# Patient Record
Sex: MALE | Race: BLACK OR AFRICAN AMERICAN | URBAN - METROPOLITAN AREA
[De-identification: names, ages, dates, MRNs, and addresses within clinical notes are randomized per-mention and may not be internally consistent; named-entity substitution may affect disease eponyms.]

---

## 2019-02-07 ENCOUNTER — HISTORICAL (OUTPATIENT)
Dept: ADMINISTRATIVE | Facility: HOSPITAL | Age: 79
End: 2019-02-07

## 2019-02-07 LAB
ABS NEUT (OLG): 2.47 X10(3)/MCL (ref 2.1–9.2)
ALBUMIN SERPL-MCNC: 3.5 GM/DL (ref 3.4–5)
ALBUMIN/GLOB SERPL: 0.9 RATIO (ref 1.1–2)
ALP SERPL-CCNC: 117 UNIT/L (ref 50–136)
ALT SERPL-CCNC: 32 UNIT/L (ref 12–78)
AST SERPL-CCNC: 18 UNIT/L (ref 15–37)
BASOPHILS # BLD AUTO: 0 X10(3)/MCL (ref 0–0.2)
BASOPHILS NFR BLD AUTO: 0.4 %
BILIRUB SERPL-MCNC: 0.4 MG/DL (ref 0.2–1)
BILIRUBIN DIRECT+TOT PNL SERPL-MCNC: 0.1 MG/DL (ref 0–0.5)
BILIRUBIN DIRECT+TOT PNL SERPL-MCNC: 0.3 MG/DL (ref 0–0.8)
BUN SERPL-MCNC: 14 MG/DL (ref 7–18)
CALCIUM SERPL-MCNC: 9.3 MG/DL (ref 8.5–10.1)
CHLORIDE SERPL-SCNC: 105 MMOL/L (ref 98–107)
CO2 SERPL-SCNC: 25 MMOL/L (ref 21–32)
CREAT SERPL-MCNC: 1.12 MG/DL (ref 0.7–1.3)
EOSINOPHIL # BLD AUTO: 0.1 X10(3)/MCL (ref 0–0.9)
EOSINOPHIL NFR BLD AUTO: 2.3 %
ERYTHROCYTE [DISTWIDTH] IN BLOOD BY AUTOMATED COUNT: 14.3 % (ref 11.5–17)
GLOBULIN SER-MCNC: 4 GM/DL (ref 2.4–3.5)
GLUCOSE SERPL-MCNC: 189 MG/DL (ref 74–106)
HCT VFR BLD AUTO: 43.3 % (ref 42–52)
HGB BLD-MCNC: 13.9 GM/DL (ref 14–18)
LYMPHOCYTES # BLD AUTO: 1.6 X10(3)/MCL (ref 0.6–4.6)
LYMPHOCYTES NFR BLD AUTO: 34 %
MCH RBC QN AUTO: 26.6 PG (ref 27–31)
MCHC RBC AUTO-ENTMCNC: 32.1 GM/DL (ref 33–36)
MCV RBC AUTO: 83 FL (ref 80–94)
MONOCYTES # BLD AUTO: 0.5 X10(3)/MCL (ref 0.1–1.3)
MONOCYTES NFR BLD AUTO: 10.6 %
NEUTROPHILS # BLD AUTO: 2.5 X10(3)/MCL (ref 2.1–9.2)
NEUTROPHILS NFR BLD AUTO: 52.5 %
PLATELET # BLD AUTO: 315 X10(3)/MCL (ref 130–400)
PMV BLD AUTO: 8.6 FL (ref 9.4–12.4)
POTASSIUM SERPL-SCNC: 3.8 MMOL/L (ref 3.5–5.1)
PROT SERPL-MCNC: 7.5 GM/DL (ref 6.4–8.2)
RBC # BLD AUTO: 5.22 X10(6)/MCL (ref 4.7–6.1)
SODIUM SERPL-SCNC: 138 MMOL/L (ref 136–145)
WBC # SPEC AUTO: 4.7 X10(3)/MCL (ref 4.5–11.5)

## 2019-02-11 ENCOUNTER — HISTORICAL (OUTPATIENT)
Dept: RADIATION THERAPY | Facility: HOSPITAL | Age: 79
End: 2019-02-11

## 2019-02-18 ENCOUNTER — HISTORICAL (OUTPATIENT)
Dept: ANESTHESIOLOGY | Facility: HOSPITAL | Age: 79
End: 2019-02-18

## 2019-03-21 ENCOUNTER — HISTORICAL (OUTPATIENT)
Dept: ADMINISTRATIVE | Facility: HOSPITAL | Age: 79
End: 2019-03-21

## 2019-03-21 LAB
ABS NEUT (OLG): 2.25 X10(3)/MCL (ref 2.1–9.2)
ALBUMIN SERPL-MCNC: 3.5 GM/DL (ref 3.4–5)
ALBUMIN/GLOB SERPL: 0.8 {RATIO}
ALP SERPL-CCNC: 130 UNIT/L (ref 50–136)
ALT SERPL-CCNC: 25 UNIT/L (ref 12–78)
AST SERPL-CCNC: 23 UNIT/L (ref 15–37)
BASOPHILS # BLD AUTO: 0 X10(3)/MCL (ref 0–0.2)
BASOPHILS NFR BLD AUTO: 0.4 %
BILIRUB SERPL-MCNC: 0.4 MG/DL (ref 0.2–1)
BILIRUBIN DIRECT+TOT PNL SERPL-MCNC: 0.1 MG/DL (ref 0–0.2)
BILIRUBIN DIRECT+TOT PNL SERPL-MCNC: 0.3 MG/DL (ref 0–0.8)
BUN SERPL-MCNC: 12 MG/DL (ref 7–18)
CALCIUM SERPL-MCNC: 9.6 MG/DL (ref 8.5–10.1)
CHLORIDE SERPL-SCNC: 101 MMOL/L (ref 98–107)
CO2 SERPL-SCNC: 28 MMOL/L (ref 21–32)
CREAT SERPL-MCNC: 1.06 MG/DL (ref 0.7–1.3)
EOSINOPHIL # BLD AUTO: 0 X10(3)/MCL (ref 0–0.9)
EOSINOPHIL NFR BLD AUTO: 0.8 %
ERYTHROCYTE [DISTWIDTH] IN BLOOD BY AUTOMATED COUNT: 13 % (ref 11.5–17)
GLOBULIN SER-MCNC: 4.2 GM/DL (ref 2.4–3.5)
GLUCOSE SERPL-MCNC: 172 MG/DL (ref 74–106)
HCT VFR BLD AUTO: 44.4 % (ref 42–52)
HGB BLD-MCNC: 14.4 GM/DL (ref 14–18)
LYMPHOCYTES # BLD AUTO: 2.3 X10(3)/MCL (ref 0.6–4.6)
LYMPHOCYTES NFR BLD AUTO: 46.8 %
MCH RBC QN AUTO: 27.3 PG (ref 27–31)
MCHC RBC AUTO-ENTMCNC: 32.4 GM/DL (ref 33–36)
MCV RBC AUTO: 84.3 FL (ref 80–94)
MONOCYTES # BLD AUTO: 0.3 X10(3)/MCL (ref 0.1–1.3)
MONOCYTES NFR BLD AUTO: 6.8 %
NEUTROPHILS # BLD AUTO: 2.2 X10(3)/MCL (ref 2.1–9.2)
NEUTROPHILS NFR BLD AUTO: 45.2 %
PLATELET # BLD AUTO: 334 X10(3)/MCL (ref 130–400)
PMV BLD AUTO: 8.5 FL (ref 9.4–12.4)
POTASSIUM SERPL-SCNC: 3.7 MMOL/L (ref 3.5–5.1)
PROT SERPL-MCNC: 7.7 GM/DL (ref 6.4–8.2)
RBC # BLD AUTO: 5.27 X10(6)/MCL (ref 4.7–6.1)
SODIUM SERPL-SCNC: 138 MMOL/L (ref 136–145)
WBC # SPEC AUTO: 5 X10(3)/MCL (ref 4.5–11.5)

## 2019-04-09 ENCOUNTER — HISTORICAL (OUTPATIENT)
Dept: ADMINISTRATIVE | Facility: HOSPITAL | Age: 79
End: 2019-04-09

## 2019-04-09 LAB
ABS NEUT (OLG): 3.36 X10(3)/MCL (ref 2.1–9.2)
ALBUMIN SERPL-MCNC: 3.4 GM/DL (ref 3.4–5)
ALBUMIN/GLOB SERPL: 0.9 RATIO (ref 1.1–2)
ALP SERPL-CCNC: 141 UNIT/L (ref 50–136)
ALT SERPL-CCNC: 31 UNIT/L (ref 12–78)
AST SERPL-CCNC: 26 UNIT/L (ref 15–37)
BASOPHILS # BLD AUTO: 0 X10(3)/MCL (ref 0–0.2)
BASOPHILS NFR BLD AUTO: 0.5 %
BILIRUB SERPL-MCNC: 0.4 MG/DL (ref 0.2–1)
BILIRUBIN DIRECT+TOT PNL SERPL-MCNC: 0.1 MG/DL (ref 0–0.5)
BILIRUBIN DIRECT+TOT PNL SERPL-MCNC: 0.3 MG/DL (ref 0–0.8)
BUN SERPL-MCNC: 14 MG/DL (ref 7–18)
CALCIUM SERPL-MCNC: 9.6 MG/DL (ref 8.5–10.1)
CHLORIDE SERPL-SCNC: 101 MMOL/L (ref 98–107)
CO2 SERPL-SCNC: 26 MMOL/L (ref 21–32)
CREAT SERPL-MCNC: 1.16 MG/DL (ref 0.7–1.3)
EOSINOPHIL # BLD AUTO: 0 X10(3)/MCL (ref 0–0.9)
EOSINOPHIL NFR BLD AUTO: 0.8 %
ERYTHROCYTE [DISTWIDTH] IN BLOOD BY AUTOMATED COUNT: 12.8 % (ref 11.5–17)
GLOBULIN SER-MCNC: 3.8 GM/DL (ref 2.4–3.5)
GLUCOSE SERPL-MCNC: 256 MG/DL (ref 74–106)
HCT VFR BLD AUTO: 43.3 % (ref 42–52)
HGB BLD-MCNC: 14.1 GM/DL (ref 14–18)
LYMPHOCYTES # BLD AUTO: 2.4 X10(3)/MCL (ref 0.6–4.6)
LYMPHOCYTES NFR BLD AUTO: 38.3 %
MCH RBC QN AUTO: 27.6 PG (ref 27–31)
MCHC RBC AUTO-ENTMCNC: 32.6 GM/DL (ref 33–36)
MCV RBC AUTO: 84.7 FL (ref 80–94)
MONOCYTES # BLD AUTO: 0.4 X10(3)/MCL (ref 0.1–1.3)
MONOCYTES NFR BLD AUTO: 6.4 %
NEUTROPHILS # BLD AUTO: 3.4 X10(3)/MCL (ref 2.1–9.2)
NEUTROPHILS NFR BLD AUTO: 53.8 %
PLATELET # BLD AUTO: 313 X10(3)/MCL (ref 130–400)
PMV BLD AUTO: 8.6 FL (ref 9.4–12.4)
POTASSIUM SERPL-SCNC: 3.4 MMOL/L (ref 3.5–5.1)
PROT SERPL-MCNC: 7.2 GM/DL (ref 6.4–8.2)
RBC # BLD AUTO: 5.11 X10(6)/MCL (ref 4.7–6.1)
SODIUM SERPL-SCNC: 137 MMOL/L (ref 136–145)
WBC # SPEC AUTO: 6.2 X10(3)/MCL (ref 4.5–11.5)

## 2019-04-10 ENCOUNTER — HISTORICAL (OUTPATIENT)
Dept: INFUSION THERAPY | Facility: HOSPITAL | Age: 79
End: 2019-04-10

## 2019-04-17 ENCOUNTER — HISTORICAL (OUTPATIENT)
Dept: INFUSION THERAPY | Facility: HOSPITAL | Age: 79
End: 2019-04-17

## 2019-04-17 LAB
ABS NEUT (OLG): 1.72 X10(3)/MCL (ref 2.1–9.2)
ALBUMIN SERPL-MCNC: 3.4 GM/DL (ref 3.4–5)
ALBUMIN/GLOB SERPL: 0.8 RATIO (ref 1.1–2)
ALP SERPL-CCNC: 120 UNIT/L (ref 50–136)
ALT SERPL-CCNC: 60 UNIT/L (ref 12–78)
AST SERPL-CCNC: 29 UNIT/L (ref 15–37)
BASOPHILS # BLD AUTO: 0 X10(3)/MCL (ref 0–0.2)
BASOPHILS NFR BLD AUTO: 0.8 %
BILIRUB SERPL-MCNC: 0.6 MG/DL (ref 0.2–1)
BILIRUBIN DIRECT+TOT PNL SERPL-MCNC: 0.1 MG/DL (ref 0–0.5)
BILIRUBIN DIRECT+TOT PNL SERPL-MCNC: 0.5 MG/DL (ref 0–0.8)
BUN SERPL-MCNC: 14 MG/DL (ref 7–18)
CALCIUM SERPL-MCNC: 9.6 MG/DL (ref 8.5–10.1)
CHLORIDE SERPL-SCNC: 101 MMOL/L (ref 98–107)
CO2 SERPL-SCNC: 28 MMOL/L (ref 21–32)
CREAT SERPL-MCNC: 1.03 MG/DL (ref 0.7–1.3)
EOSINOPHIL # BLD AUTO: 0.1 X10(3)/MCL (ref 0–0.9)
EOSINOPHIL NFR BLD AUTO: 1.9 %
ERYTHROCYTE [DISTWIDTH] IN BLOOD BY AUTOMATED COUNT: 12.6 % (ref 11.5–17)
GLOBULIN SER-MCNC: 4 GM/DL (ref 2.4–3.5)
GLUCOSE SERPL-MCNC: 211 MG/DL (ref 74–106)
HCT VFR BLD AUTO: 39.6 % (ref 42–52)
HGB BLD-MCNC: 12.9 GM/DL (ref 14–18)
LYMPHOCYTES # BLD AUTO: 1.7 X10(3)/MCL (ref 0.6–4.6)
LYMPHOCYTES NFR BLD AUTO: 46.3 %
MCH RBC QN AUTO: 27.4 PG (ref 27–31)
MCHC RBC AUTO-ENTMCNC: 32.6 GM/DL (ref 33–36)
MCV RBC AUTO: 84.1 FL (ref 80–94)
MONOCYTES # BLD AUTO: 0.2 X10(3)/MCL (ref 0.1–1.3)
MONOCYTES NFR BLD AUTO: 5.1 %
NEUTROPHILS # BLD AUTO: 1.7 X10(3)/MCL (ref 2.1–9.2)
NEUTROPHILS NFR BLD AUTO: 45.9 %
PLATELET # BLD AUTO: 225 X10(3)/MCL (ref 130–400)
PMV BLD AUTO: 8.7 FL (ref 9.4–12.4)
POTASSIUM SERPL-SCNC: 3.6 MMOL/L (ref 3.5–5.1)
PROT SERPL-MCNC: 7.4 GM/DL (ref 6.4–8.2)
RBC # BLD AUTO: 4.71 X10(6)/MCL (ref 4.7–6.1)
SODIUM SERPL-SCNC: 136 MMOL/L (ref 136–145)
WBC # SPEC AUTO: 3.7 X10(3)/MCL (ref 4.5–11.5)

## 2019-04-18 ENCOUNTER — HISTORICAL (OUTPATIENT)
Dept: ADMINISTRATIVE | Facility: HOSPITAL | Age: 79
End: 2019-04-18

## 2019-04-18 LAB
ABS NEUT (OLG): 4.23 X10(3)/MCL (ref 2.1–9.2)
ANION GAP SERPL CALC-SCNC: 17 MMOL/L
BASOPHILS # BLD AUTO: 0 X10(3)/MCL (ref 0–0.2)
BASOPHILS NFR BLD AUTO: 0.2 %
BUN SERPL-MCNC: 20 MG/DL (ref 8–26)
CHLORIDE SERPL-SCNC: 100 MMOL/L (ref 98–109)
CREAT SERPL-MCNC: 0.9 MG/DL (ref 0.6–1.3)
ERYTHROCYTE [DISTWIDTH] IN BLOOD BY AUTOMATED COUNT: 12.4 % (ref 11.5–17)
GLUCOSE SERPL-MCNC: 212 MG/DL (ref 70–105)
HCT VFR BLD AUTO: 37.5 % (ref 42–52)
HCT VFR BLD CALC: 37 % (ref 38–51)
HGB BLD-MCNC: 12.4 GM/DL (ref 14–18)
HGB BLD-MCNC: 12.6 MG/DL (ref 12–17)
LYMPHOCYTES # BLD AUTO: 1.8 X10(3)/MCL (ref 0.6–4.6)
LYMPHOCYTES NFR BLD AUTO: 27.8 %
MCH RBC QN AUTO: 27.6 PG (ref 27–31)
MCHC RBC AUTO-ENTMCNC: 33.1 GM/DL (ref 33–36)
MCV RBC AUTO: 83.5 FL (ref 80–94)
MONOCYTES # BLD AUTO: 0.4 X10(3)/MCL (ref 0.1–1.3)
MONOCYTES NFR BLD AUTO: 5.5 %
NEUTROPHILS # BLD AUTO: 4.2 X10(3)/MCL (ref 2.1–9.2)
NEUTROPHILS NFR BLD AUTO: 66.3 %
PLATELET # BLD AUTO: 200 X10(3)/MCL (ref 130–400)
PMV BLD AUTO: 8.8 FL (ref 9.4–12.4)
POC IONIZED CALCIUM: 1.2 MMOL/L (ref 1.12–1.32)
POC TCO2: 24 MMOL/L (ref 24–29)
POTASSIUM BLD-SCNC: 3.7 MMOL/L (ref 3.5–4.9)
RBC # BLD AUTO: 4.49 X10(6)/MCL (ref 4.7–6.1)
SODIUM BLD-SCNC: 136 MMOL/L (ref 138–146)
WBC # SPEC AUTO: 6.4 X10(3)/MCL (ref 4.5–11.5)

## 2019-04-30 ENCOUNTER — HISTORICAL (OUTPATIENT)
Dept: ADMINISTRATIVE | Facility: HOSPITAL | Age: 79
End: 2019-04-30

## 2019-04-30 LAB
ABS NEUT (OLG): 2.64 X10(3)/MCL (ref 2.1–9.2)
ANION GAP SERPL CALC-SCNC: 18 MMOL/L
BASOPHILS # BLD AUTO: 0 X10(3)/MCL (ref 0–0.2)
BASOPHILS NFR BLD AUTO: 0.2 %
BUN SERPL-MCNC: 8 MG/DL (ref 8–26)
CHLORIDE SERPL-SCNC: 98 MMOL/L (ref 98–109)
CREAT SERPL-MCNC: 1 MG/DL (ref 0.6–1.3)
EOSINOPHIL # BLD AUTO: 0 X10(3)/MCL (ref 0–0.9)
EOSINOPHIL NFR BLD AUTO: 0.4 %
ERYTHROCYTE [DISTWIDTH] IN BLOOD BY AUTOMATED COUNT: 13.8 % (ref 11.5–17)
GLUCOSE SERPL-MCNC: 158 MG/DL (ref 70–105)
HCT VFR BLD AUTO: 38.4 % (ref 42–52)
HCT VFR BLD CALC: 38 % (ref 38–51)
HGB BLD-MCNC: 12.5 GM/DL (ref 14–18)
HGB BLD-MCNC: 12.9 MG/DL (ref 12–17)
LYMPHOCYTES # BLD AUTO: 1.7 X10(3)/MCL (ref 0.6–4.6)
LYMPHOCYTES NFR BLD AUTO: 35.8 %
MCH RBC QN AUTO: 27.6 PG (ref 27–31)
MCHC RBC AUTO-ENTMCNC: 32.6 GM/DL (ref 33–36)
MCV RBC AUTO: 84.8 FL (ref 80–94)
MONOCYTES # BLD AUTO: 0.4 X10(3)/MCL (ref 0.1–1.3)
MONOCYTES NFR BLD AUTO: 7.4 %
NEUTROPHILS # BLD AUTO: 2.6 X10(3)/MCL (ref 2.1–9.2)
NEUTROPHILS NFR BLD AUTO: 56 %
PLATELET # BLD AUTO: 295 X10(3)/MCL (ref 130–400)
PMV BLD AUTO: 8 FL (ref 9.4–12.4)
POC IONIZED CALCIUM: 1.2 MMOL/L (ref 1.12–1.32)
POC TCO2: 28 MMOL/L (ref 24–29)
POTASSIUM BLD-SCNC: 3.5 MMOL/L (ref 3.5–4.9)
RBC # BLD AUTO: 4.53 X10(6)/MCL (ref 4.7–6.1)
SODIUM BLD-SCNC: 139 MMOL/L (ref 138–146)
WBC # SPEC AUTO: 4.7 X10(3)/MCL (ref 4.5–11.5)

## 2019-05-01 ENCOUNTER — HISTORICAL (OUTPATIENT)
Dept: INFUSION THERAPY | Facility: HOSPITAL | Age: 79
End: 2019-05-01

## 2019-05-21 ENCOUNTER — HISTORICAL (OUTPATIENT)
Dept: ADMINISTRATIVE | Facility: HOSPITAL | Age: 79
End: 2019-05-21

## 2019-05-21 LAB
ABS NEUT (OLG): 1.36 X10(3)/MCL (ref 2.1–9.2)
ALBUMIN SERPL-MCNC: 3.4 GM/DL (ref 3.4–5)
ALBUMIN/GLOB SERPL: 1 {RATIO}
ALP SERPL-CCNC: 116 UNIT/L (ref 50–136)
ALT SERPL-CCNC: 43 UNIT/L (ref 12–78)
ANISOCYTOSIS BLD QL SMEAR: SLIGHT
AST SERPL-CCNC: 39 UNIT/L (ref 15–37)
BASOPHILS # BLD AUTO: 0 X10(3)/MCL (ref 0–0.2)
BASOPHILS NFR BLD AUTO: 0.3 %
BILIRUB SERPL-MCNC: 0.3 MG/DL (ref 0.2–1)
BILIRUBIN DIRECT+TOT PNL SERPL-MCNC: 0.1 MG/DL (ref 0–0.2)
BILIRUBIN DIRECT+TOT PNL SERPL-MCNC: 0.2 MG/DL (ref 0–0.8)
BUN SERPL-MCNC: 7 MG/DL (ref 7–18)
CALCIUM SERPL-MCNC: 9.4 MG/DL (ref 8.5–10.1)
CHLORIDE SERPL-SCNC: 106 MMOL/L (ref 98–107)
CO2 SERPL-SCNC: 30 MMOL/L (ref 21–32)
CREAT SERPL-MCNC: 0.93 MG/DL (ref 0.7–1.3)
EOSINOPHIL # BLD AUTO: 0 X10(3)/MCL (ref 0–0.9)
EOSINOPHIL NFR BLD AUTO: 0.6 %
ERYTHROCYTE [DISTWIDTH] IN BLOOD BY AUTOMATED COUNT: 17.3 % (ref 11.5–17)
GLOBULIN SER-MCNC: 3.5 GM/DL (ref 2.4–3.5)
GLUCOSE SERPL-MCNC: 162 MG/DL (ref 74–106)
HCT VFR BLD AUTO: 36.1 % (ref 42–52)
HGB BLD-MCNC: 11.8 GM/DL (ref 14–18)
LYMPHOCYTES # BLD AUTO: 1.5 X10(3)/MCL (ref 0.6–4.6)
LYMPHOCYTES NFR BLD AUTO: 44.1 %
LYMPHOCYTES NFR BLD MANUAL: 3 /MCL
LYMPHOCYTES NFR BLD MANUAL: 54 % (ref 13–40)
MAGNESIUM SERPL-MCNC: 1.7 MG/DL (ref 1.8–2.4)
MCH RBC QN AUTO: 28.8 PG (ref 27–31)
MCHC RBC AUTO-ENTMCNC: 32.7 GM/DL (ref 33–36)
MCV RBC AUTO: 88 FL (ref 80–94)
MONOCYTES # BLD AUTO: 0.6 X10(3)/MCL (ref 0.1–1.3)
MONOCYTES NFR BLD AUTO: 15.9 %
MONOCYTES NFR BLD MANUAL: 14 % (ref 2–11)
NEUTROPHILS # BLD AUTO: 1.4 X10(3)/MCL (ref 2.1–9.2)
NEUTROPHILS NFR BLD AUTO: 39.1 %
NEUTROPHILS NFR BLD MANUAL: 32 % (ref 47–80)
PHOSPHATE SERPL-MCNC: 2.1 MG/DL (ref 2.5–4.9)
PLATELET # BLD AUTO: 285 X10(3)/MCL (ref 130–400)
PLATELET # BLD EST: NORMAL 10*3/UL
PMV BLD AUTO: 8.1 FL (ref 9.4–12.4)
POTASSIUM SERPL-SCNC: 3.5 MMOL/L (ref 3.5–5.1)
PROT SERPL-MCNC: 6.9 GM/DL (ref 6.4–8.2)
RBC # BLD AUTO: 4.1 X10(6)/MCL (ref 4.7–6.1)
RBC MORPH BLD: ABNORMAL
SODIUM SERPL-SCNC: 141 MMOL/L (ref 136–145)
WBC # SPEC AUTO: 3.5 X10(3)/MCL (ref 4.5–11.5)

## 2019-05-22 ENCOUNTER — HISTORICAL (OUTPATIENT)
Dept: INFUSION THERAPY | Facility: HOSPITAL | Age: 79
End: 2019-05-22

## 2019-05-29 ENCOUNTER — HISTORICAL (OUTPATIENT)
Dept: INFUSION THERAPY | Facility: HOSPITAL | Age: 79
End: 2019-05-29

## 2019-05-29 LAB
ABS NEUT (OLG): 0.99 X10(3)/MCL (ref 2.1–9.2)
ALBUMIN SERPL-MCNC: 3.2 GM/DL (ref 3.4–5)
ALBUMIN/GLOB SERPL: 0.9 {RATIO}
ALP SERPL-CCNC: 121 UNIT/L (ref 50–136)
ALT SERPL-CCNC: 46 UNIT/L (ref 12–78)
AST SERPL-CCNC: 32 UNIT/L (ref 15–37)
BASOPHILS # BLD AUTO: 0 X10(3)/MCL (ref 0–0.2)
BASOPHILS NFR BLD AUTO: 0.7 %
BILIRUB SERPL-MCNC: 0.4 MG/DL (ref 0.2–1)
BILIRUBIN DIRECT+TOT PNL SERPL-MCNC: 0.1 MG/DL (ref 0–0.2)
BILIRUBIN DIRECT+TOT PNL SERPL-MCNC: 0.3 MG/DL (ref 0–0.8)
BUN SERPL-MCNC: 8 MG/DL (ref 7–18)
CALCIUM SERPL-MCNC: 9.5 MG/DL (ref 8.5–10.1)
CHLORIDE SERPL-SCNC: 108 MMOL/L (ref 98–107)
CO2 SERPL-SCNC: 27 MMOL/L (ref 21–32)
CREAT SERPL-MCNC: 1.02 MG/DL (ref 0.7–1.3)
EOSINOPHIL # BLD AUTO: 0 X10(3)/MCL (ref 0–0.9)
EOSINOPHIL NFR BLD AUTO: 1.8 %
ERYTHROCYTE [DISTWIDTH] IN BLOOD BY AUTOMATED COUNT: 17 % (ref 11.5–17)
GLOBULIN SER-MCNC: 3.7 GM/DL (ref 2.4–3.5)
GLUCOSE SERPL-MCNC: 192 MG/DL (ref 74–106)
HCT VFR BLD AUTO: 33.4 % (ref 42–52)
HGB BLD-MCNC: 10.8 GM/DL (ref 14–18)
LYMPHOCYTES # BLD AUTO: 1.5 X10(3)/MCL (ref 0.6–4.6)
LYMPHOCYTES NFR BLD AUTO: 54 %
MCH RBC QN AUTO: 29.6 PG (ref 27–31)
MCHC RBC AUTO-ENTMCNC: 32.3 GM/DL (ref 33–36)
MCV RBC AUTO: 91.5 FL (ref 80–94)
MONOCYTES # BLD AUTO: 0.2 X10(3)/MCL (ref 0.1–1.3)
MONOCYTES NFR BLD AUTO: 6.6 %
NEUTROPHILS # BLD AUTO: 1 X10(3)/MCL (ref 2.1–9.2)
NEUTROPHILS NFR BLD AUTO: 36.5 %
PLATELET # BLD AUTO: 140 X10(3)/MCL (ref 130–400)
PMV BLD AUTO: 8.3 FL (ref 9.4–12.4)
POTASSIUM SERPL-SCNC: 3.7 MMOL/L (ref 3.5–5.1)
PROT SERPL-MCNC: 6.9 GM/DL (ref 6.4–8.2)
RBC # BLD AUTO: 3.65 X10(6)/MCL (ref 4.7–6.1)
SODIUM SERPL-SCNC: 143 MMOL/L (ref 136–145)
WBC # SPEC AUTO: 2.7 X10(3)/MCL (ref 4.5–11.5)

## 2019-06-17 ENCOUNTER — HISTORICAL (OUTPATIENT)
Dept: RADIOLOGY | Facility: HOSPITAL | Age: 79
End: 2019-06-17

## 2019-06-19 ENCOUNTER — HISTORICAL (OUTPATIENT)
Dept: ADMINISTRATIVE | Facility: HOSPITAL | Age: 79
End: 2019-06-19

## 2019-06-19 LAB
ABS NEUT (OLG): 1.79 X10(3)/MCL (ref 2.1–9.2)
ANION GAP SERPL CALC-SCNC: 18 MMOL/L
BASOPHILS # BLD AUTO: 0 X10(3)/MCL (ref 0–0.2)
BASOPHILS NFR BLD AUTO: 0.8 %
BUN SERPL-MCNC: 10 MG/DL (ref 8–26)
CHLORIDE SERPL-SCNC: 104 MMOL/L (ref 98–109)
CREAT SERPL-MCNC: 0.8 MG/DL (ref 0.6–1.3)
EOSINOPHIL # BLD AUTO: 0 X10(3)/MCL (ref 0–0.9)
EOSINOPHIL NFR BLD AUTO: 1 %
ERYTHROCYTE [DISTWIDTH] IN BLOOD BY AUTOMATED COUNT: 16.8 % (ref 11.5–17)
GLUCOSE SERPL-MCNC: 217 MG/DL (ref 70–105)
HCT VFR BLD AUTO: 35.7 % (ref 42–52)
HCT VFR BLD CALC: 34 % (ref 38–51)
HGB BLD-MCNC: 11.2 GM/DL (ref 14–18)
HGB BLD-MCNC: 11.6 MG/DL (ref 12–17)
LYMPHOCYTES # BLD AUTO: 1.6 X10(3)/MCL (ref 0.6–4.6)
LYMPHOCYTES NFR BLD AUTO: 40.4 %
MCH RBC QN AUTO: 30.4 PG (ref 27–31)
MCHC RBC AUTO-ENTMCNC: 31.4 GM/DL (ref 33–36)
MCV RBC AUTO: 96.7 FL (ref 80–94)
MONOCYTES # BLD AUTO: 0.4 X10(3)/MCL (ref 0.1–1.3)
MONOCYTES NFR BLD AUTO: 11.2 %
NEUTROPHILS # BLD AUTO: 1.8 X10(3)/MCL (ref 2.1–9.2)
NEUTROPHILS NFR BLD AUTO: 46.6 %
PLATELET # BLD AUTO: 293 X10(3)/MCL (ref 130–400)
PMV BLD AUTO: 8.8 FL (ref 9.4–12.4)
POC IONIZED CALCIUM: 1.08 MMOL/L (ref 1.12–1.32)
POC TCO2: 22 MMOL/L (ref 24–29)
POTASSIUM BLD-SCNC: 3.8 MMOL/L (ref 3.5–4.9)
RBC # BLD AUTO: 3.69 X10(6)/MCL (ref 4.7–6.1)
SODIUM BLD-SCNC: 140 MMOL/L (ref 138–146)
WBC # SPEC AUTO: 3.8 X10(3)/MCL (ref 4.5–11.5)

## 2022-05-02 NOTE — HISTORICAL [facility] CERNER
This is a historical note converted from [employer]. Formatting and pictures may have been removed.  Please reference Irma for original formatting and attached multimedia. Chief Complaint  no complaints  History of Present Illness  ?  Referring physician: Sinai Perez MD  [facility]  ?  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 ? ? ? ? ? ? ? ? ?Abnormal PET right vocal cord  ?? ? ? ? ? ? ? ? ? ?Contralateral hypermetabolic left hilar node  ?? ? ? ? ? ? ? ? ? ? Abnormal rib uptake question of posttraumatic  ?  ?  History?  this patient was referred to Dr. Simms in the pulmonary clinic at [facility] for new lung mass.. ?He had a CT scan done October 31, 2018 which showed a right perihilar mass highly suggestive of underlying malignancy there were several mediastinal lymph nodes including AP window node 1.7 x 0 point left perihilar node 1.3 x 1.4 cm and a 2.6 x 2.8 solid appearing mass in the right perihilar region.. ?Patient underwent a bronchoscopy. ?Biopsies left upper lobe were negative. ?The biopsies from the right lower lobe were positive for squamous cell carcinoma. ?He required home oxygen because of decreased O2 saturation with ambulation.  ?  ?  ?  Pathology  December 11, 2018 right lower lobe lung biopsy poorly?differentiated squamous cell carcinoma  2. ?Left upper lobe lung biopsy no evidence of malignancy organizing pneumonia  Right lower lobe bronchial washings?positive for poor degenerative changes squamous cell carcinoma  Right lower lobe BAL negative  Left upper lobe bronchial washings negative  Left upper lobe?BAL negative  ?  Pulmonary function test FEV1 76%, FVC 84%  Postbronchodilator FEV1 86% FVC 88%  Total lung capacity 111  DLCO 44%  Residual volume 189  ?  ?  1/22/2019 PET imaging: There is a focal asymmetric hypermetabolic active lesion 4.5 SUV on the right posterior vocal cord at the level of the cartilage.  Within the chest there is a large hypermetabolic solid  mass involving the pulmonary right lower lobe with SUV of 14. ?With a contralateral hypermetabolic left hilar lymph node SUV of 5.4. ?There is no ipsilateral right hilar adenopathy. ?There is abnormal hypermetabolic activity involving the sequential anterior left sixth seventh and eighth rib with a nondisplaced fracture at the seventh rib presumed posttraumatic  ? lipoma at the right lower lateral chest wall  Peripheral atelectasis within the anterior segment left lower lobe there is a 7 mm nodule within the superior segment left lower lobe impression highly concerning for bronchitic carcinoma involving the superior segment right lower lobe with a contralateral left hilar hypermetabolic lymph node. ?And activity involving multiple left lower anterior ribs  ?  ??  ?  Home medications: Amlodipine, atorvastatin, Symbicort, refreshed, cyclobenzaprine, diclofenac gel, Aricept, fish oil, Neurontin, glipizide, Megace, Protonix, potassium, Zoloft, Viagra, trazodone,olodaterol  Allergies PCN  ?  ?   ?  Medical history arthropathy, BPH, COPD, reflux, hypertension, hyperlipidemia, peripheral vascular disease, diabetes, cognitive dysfunction? [1]  Review of Systems  General:?Generalized weakness, no fatigue, weight loss 47 # in 2-3 month , no weight gain, no fever, no chills,?+ night sweats, no hot flashes.  ?  HEENT: No headache, needs new glasses, no cataracts,?+ decreased hearing- has hearing aids, no ringing in the ears, no sinus congestion,?+ postnasal drip, no nosebleeds, no hayfever, no dental problems, no sore throat, no hoarseness.  ?  Neck: No swollen glands, no goiter, no lumps or masses.  ?  Breast: No lumps, no pain, no nipple discharge, no change in self-exam.?  ?  Heart: No chest pain, no palpitations, no swelling in the legs, no shortness of breath lying down.  ?  Lungs:?+ shortness of breath,?+ cough,?+ phlegm, no blood in sputum, no wheezing, no history of TB  ?  Gastrointestinal: No heartburn, no trouble  swallowing, no change in appetite, no abdominal pain, no nausea, no vomiting, no constipation, +diarrhea, no dark stool, no hemorrhoids, no jaundice, no hepatitis.  ?  Urinary:?+ frequency,?+ urgency, no burning with urination, no blood in urine, no kidney stones, no incontinence.  ?  Neurological: No fainting, no seizures,?+ numbness or tingling, no localized weakness, no tremors, no confusion.  ?  Skin: no rash, no itching, no skin lesions, no nail changes.  ?  Blood vessels: No thrombophlebitis, no?varicose veins, no blood clots, no easy bruising, no abnormal bleeding + claudincarion  ?  Psychiatric: No anxiety,?+ depression, no mood swings,?+ stress, not under psychiatric care  ?  Pain: Severity equals:? ___6_?? out of 10  ?????????? Location : legs  ?????????? Duration:  Physical Exam  Vitals & Measurements  T:?36.7? ?C (Oral)? HR:?83(Peripheral)? BP:?125/73?  HT:?171?cm? WT:?76.2?kg? BMI:?26.06?  VITAL SIGNS: ?Reviewed. ?[ ?]  GENERAL: [Elderly male disheveled in no apparent cardiorespiratory distress]?  HEAD: Normocephalic, atraumatic  EYES:? Pupils are equal.?? reactive to light.? Extraocular motions intact.? No scleral icterus.? No pallor.  EARS:? Hearing mild impairment.? TMs without wax.  MOUTH:? Oropharynx is clear without exudates or erythema. poor-Dentition  NECK: Supple no adenopathy, no JVD.? Trachea midline  CHEST:?Breath sounds bilaterally. ?With scattered wheezing.  CARDIAC:? Regular rate and rhythm.? S1 and S2, without murmurs, gallops, or rubs.  VASCULAR:? No Edema.? Bilateral upper extremity pulses are normal and equal.? Bilateral lower extremity dorsalis pedis are faint. ?Good capillary refill.  ABDOMEN:? Soft, without detectable tenderness.? No sign of distention.? No?? rebound or guarding, and no masses palpated.?? Bowel Sounds normal.  MUSCULOSKELETAL:? Good range of motion of all major joints. Extremities without? edema.?  NEUROLOGIC EXAM:? Alert and oriented x 3.? .?? Speech normal.?  Follows commands.? Cranial nerves intact, ambulates with a cane.? No loss of balance or dizziness on quick rising.? Able to get up from sitting to standing position?independently.? And arise from?supine to sitting independently.  PSYCHIATRIC:? Mood normal.  SKIN:? No rash or lesions.  Lymph nodes: no? 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  ?on PET [id number] ? bone  ?  His PET scan shows  ????????????????????carcinoma of the right lower lobe  ?? ? ? ? ? ? ? ? ? ?Abnormal PET right vocal cord  ?? ? ? ? ? ? ? ? ? ?Contralateral hypermetabolic left hilar node  ?? ? ? ? ? ? ? ? ? ? Abnormal rib uptake ,question of posttraumatic- patient denies any trauma.  ?   He has lost 37 #  I had a long discussion with the patient and his family regarding his symptoms. ?The abnormalities on the PET scan. ?Reviewed his labs?and his PET scan report.? We discussed his biopsy report and the fact that he has squamous cell carcinoma the lung.? And the need to follow-up these other abnormalities. ?We discussed that he is not a surgical candidate given his hypoxemia and use of oxygen.? We discussed the use of?chemotherapy?or combined chemotherapy radiation.? I had the opportunity to ask questions.  ?  Recommendations:  bone scan  ENT evaluation-of the right vocal cord  Send his tumor off for next generation sequencing and PDL 1 testing  Contralateral hypermetabolic left hilar lymph node-while this could represent?contralateral metastatic disease. ?It is quite possible this is inflammatory as well.? A repeat endoscopic bronchoscopy and ultrasound and biopsy?is a possibility.? But if his bone scan shows consistent with metastatic disease?and this is highly likely.  I do not think this patient is a surgical candidate given his poor pulmonary  function test?hypoxemia need for home oxygen.  We will get an opinion by Rad onc? as well  repeat his cbc/cmp today  stop his fish-oil and cholesterol meds with leg cramps and diarrhea  and RTC in 3 weeks  ?   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  Historical  No qualifying data  Medical history arthropathy, BPH, COPD, reflux, hypertension, hyperlipidemia, peripheral vascular disease, diabetes, cognitive dysfunction  Procedure/Surgical History  Cataract (1989)  Circumcision (1960)  Cystectomy (1960)   Medications  amlodipine 10 mg oral tablet, 10 mg= 1 tab(s), Oral, Daily  Artificial Tears preserved ophthalmic solution, 1 drop(s), Eye-Both, QID, PRN  atorvastatin 40 mg oral tablet, 40 mg= 1 tab(s), Oral, Daily  budesonide-formoterol 160 mcg-4.5 mcg/inh inhalation aerosol, 2 puff(s), INH, BID  chlorthalidone 25 mg oral tablet, 25 mg= 1 tab(s), Oral, Daily  clopidogrel 75 mg oral tablet, 75 mg= 1 tab(s), Oral, Daily  cyclobenzaprine 10 mg oral tablet, 10 mg= 1 tab(s), Oral, TID, PRN  diclofenac 1% topical gel, 1 apple, TOP, q8hr  donepezil 10 mg oral tablet, 10 mg= 1 tab(s), Oral, At Bedtime  gabapentin 600 mg oral tablet, 600 mg= 1 tab(s), Oral, At Bedtime  glipiZIDE 10 mg oral tablet, 10 mg= 1 tab(s), Oral, Daily  megestrol 40 mg/mL oral suspension, 400 mg= 10 mL, Oral, Daily,? ?Not Taking per Prescriber  olodaterol 2.5 mcg/inh inhalation aerosol, 2 puff(s), INH, Daily  omega-3 polyunsaturated fatty acids (Fish Oil 1000mg Cap), 1 cap(s), Oral, Daily  Pantoprazole 40 mg ORAL EC-Tablet, 40 mg= 1 tab(s), Oral, Daily  potassium chloride 10 mEq oral CAPSULE extended release, 20 mEq= 2 cap(s), Oral, Daily  Refresh Plus ophthalmic solution, 1 drop(s), Eye-Both, QID, PRN  sertraline 100 mg oral tablet, 100 mg= 1 tab(s), Oral, Daily  sildenafil 100 mg oral tablet, 100 mg= 1 tab(s), Oral, Daily  traZODone 100 mg oral tablet, 100 mg= 1 tab(s), Oral, At Bedtime,? ?Not  Taking per Prescriber  Allergies  penicillins?(rash)  Social History  Alcohol  Current, 1-2 times per month, 01/24/2019  Substance Abuse  Current, Marijuana, 01/24/2019  Tobacco  Former smoker, quit more than 30 days ago, N/A, 02/07/2019  Former smoker, quit more than 30 days ago, No, 01/24/2019  Family History  Hypertension.: Mother.  Stroke: Mother.  Health Maintenance  Health Maintenance  ???Pending?(in the next year)  ??? ??Due?  ??? ? ? ?ADL Screening due??02/07/19??and every 1??year(s)  ??? ? ? ?Advance Directive due??02/07/19??and every 1??year(s)  ??? ? ? ?Aspirin Therapy for CVD Prevention due??02/07/19??and every 1??year(s)  ??? ? ? ?Cognitive Screening due??02/07/19??and every 1??year(s)  ??? ? ? ?Functional Assessment due??02/07/19??and every 1??year(s)  ??? ? ? ?Geriatric Depression Screening due??02/07/19??and every 1??year(s)  ??? ? ? ?Pneumococcal Vaccine due??02/07/19??Variable frequency  ??? ? ? ?Tetanus Vaccine due??02/07/19??and every 10??year(s)  ??? ? ? ?Zoster Vaccine due??02/07/19??and every 100??year(s)  ???Satisfied?(in the past 1 year)  ??? ??Satisfied?  ??? ? ? ?Blood Pressure Screening on??02/07/19.??Satisfied by Riley Rothman  ??? ? ? ?Body Mass Index Check on??02/07/19.??Satisfied by Riley Rothman  ??? ? ? ?Depression Screening on??02/07/19.??Satisfied by Riley Rothman  ??? ? ? ?Diabetes Screening on??01/24/19.??Satisfied by Ling Negron  ??? ? ? ?Fall Risk Assessment on??02/07/19.??Satisfied by Riley Rothman  ??? ? ? ?Obesity Screening on??02/07/19.??Satisfied by Riley Rothman  ?  ?  Lab Results  Laboratory evaluation January 24, 2019 UA without protein, calcium 10.3 BUN 14 creatinine 0.9. ?Normal AST ALT alk phos. ?Albumin 3.6. ?White count 4.6 hematocrit 1240 platelet count 309 ANC 1900     [1]?[facility] Transfer Summary; Andre GOOD, Sebastian GROVER 02/06/2019 16:43 CST   Patients poorly differentiated squamous cell carcinoma shows no evidence of PDL 1 expression is less  than 1%.? FISH studies for ALK rearrangement are negative.? FISH studies for ROS 1 are negative.? FISH studies for RET rearrangement are negative.? The lung targeted profile NGS is pending.

## 2022-05-02 NOTE — HISTORICAL [facility] CERNER
This is a historical note converted from [employer]. Formatting and pictures may have been removed.  Please reference [place] for original formatting and attached multimedia. Chief Complaint  ?  Referring physician: Sinai Perez MD  [facility]  ?   Radiation Oncologist: Jaime Julien MD  ?   Problem List: Stage IIIc Squamous cell carcinoma of the right lower?lung, diagnosed 12/11/18  ????????????????????? Abnormal PET right vocal cord  ?? ? ? ? ? ? ? ? ? ?? Contralateral hypermetabolic left hilar node  ????????????????????? Abnormal rib uptake question of posttraumatic  ?   Current Treatment: Will start carboplatin/taxol upon approval, education and after biopsy of contralateral left hilar lymph node  ?   Treatment History:  10/31/18: CT chest- right perihilar mass highly suggestive of underlying malignancy there were several mediastinal lymph nodes including AP?????  ???? window?node 1.7 x 0 point left perihilar node 1.3 x 1.4 cm and a 2.6 x 2.8 solid appearing mass in the right perihilar region.  ?  12/11/18: Bronchoscopy- RLL poorly differentiated SCC  PFTs-?FEV1 76%, FVC 84%  ???? Postbronchodilator: FEV1 86% FVC 88%; Total lung capacity 111; DLCO 44% Residual volume 189  ?  1/22/19: PET- focal asymmetric hypermetabolic active lesion 4.5 SUV on the right posterior vocal cord at the level of the cartilage.  Within the chest there is a large hypermetabolic solid mass involving the pulmonary right lower lobe with SUV of 14. ?With a contralateral hypermetabolic left hilar lymph node SUV of 5.4. ?There is no ipsilateral right hilar adenopathy. ?There is abnormal hypermetabolic activity involving the sequential anterior left sixth seventh and eighth rib with a nondisplaced fracture at the seventh rib presumed posttraumatic lipoma at the right lower lateral chest wall.  Peripheral atelectasis within the anterior segment left lower lobe there is a 7 mm nodule within the superior segment left lower lobe  impression highly concerning for bronchitic carcinoma involving the superior segment right lower lobe with a contralateral left hilar hypermetabolic lymph node. ?And activity involving multiple left lower anterior ribs.  ?  2/18/19: Bone scan shows old rib fractures, no evidence of metastatic disease.  ?  3/14/19: Patient was scheduled for biopsy of left hilar node, however, he did not stop his Plavix and this was rescheduled for 3/28/19.  ?  History of Present Illness  This patient was referred to Dr. Simms in the pulmonary clinic at [facility] for new lung mass.??He had a CT scan done October 31, 2018 which showed a right perihilar mass highly suggestive of underlying malignancy there were several mediastinal lymph nodes including AP window node 1.7 x 0 point left perihilar node 1.3 x 1.4 cm and a 2.6 x 2.8 solid appearing mass in the right perihilar region.??Patient underwent a bronchoscopy. ?Biopsies left upper lobe were negative. ?The biopsies from the right lower lobe were positive for squamous cell carcinoma. ?He required home oxygen because of decreased O2 saturation with ambulation.  ?  Interval History:  3/7/9: Patient?here for scheduled follow up. Since his last?visit, he was seen by Dr. Julien with plans to start definitive therapy with?carbo/taxol, as he is not a good surgical candidate due to?severe lung impairment.?However,?Dr. Julien ordered an MRI of his brain to complete staging as well as set up a biopsy of a hypermetabolic left hilar node. He also had?a bone scan? performed on 2/18/2019?that shows no evidence of osseous?metastasis. ?Dr. Powell has?reviewed this imaging and what appears to be old rib fractures. ?He is O2 dependent.? He has wide spread pain, no hemoptysis locally advanced right-sided lung cancer 3C squamous cell carcinoma arising from the right lower lobe.  ?   3/21/19:Patient presents for follow up, however, he did not stop his plavix, therefore, biopsy of left hilar node was  cancelled. Radiation would like to get this completed before starting treatment as the result would affect treatment fields.  ?  Review of Systems  A complete 12 point ROS was performed in full with pertinent positives as described in interval history. Remainder of ROS done in full and are negative.  Physical Exam  Vitals & Measurements  T:?36.7? ?C (Oral)? HR:?65(Peripheral)? BP:?130/76?  HT:?174?cm? WT:?77.9?kg? BMI:?25.73?  General:?well-developed well-nourished in no acute distress, O2 dependent  HEENT: PERRLA, EOMI, clear conjunctiva, eyelids normal, normocephalic, no mucositis, good dentition,  Neck: full range of motion, no thyromegaly or lymphadenopathy  Respiratory:?diminished lung sounds, occasional wheezing, on multiple inhaled medications  Cardiovascular:?regular rate and rhythm without murmurs, gallops or rubs  Gastrointestinal:?soft, non-tender, non-distended with normal bowel sounds, without masses to palpation  Genitourinary: no CVA tenderness to palpation  Musculoskeletal:?full range of motion of all extremities/spine without limitation or discomfort  Integumentary: no rashes or skin lesions present  Cognition and speech: Oriented, speech clear and coherent  Psychiatric: Cooperative, appropriate mood and affect  Neurologic: cranial nerves intact, no signs of peripheral neurological deficit, motor/sensory function intact  The National Cancer [place] Toxicity Score  ECOG Performance Scale: 2 - Capable of all self-care but unable to carry out any work activities. Up and about greater than 50 percent of waking hours.? 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  1. Stage IIIc squamous cell carcinoma of right bronchus PDL 1 expression is less than  1%.??ALK/ROS/RET negative  The lung targeted profile NGS is pending.?  ?   Imagin19: PET [id number] ? bone, carcinoma of the right lower lobe  ??????????????????? Abnormal PET right vocal cord  ?? ? ? ? ? ? ? ? ? ?Contralateral hypermetabolic left hilar node- awaiting biopsy  ????????????????????Abnormal rib uptake, question of posttraumatic- patient denies any trauma.  19: Bone scan to evaluate abnormal rib uptake on PET. Bone scan shows no evidence of osseous metastasis-  ?????????????Most likely old rib fractures.  ?   Plan:  ENT evaluation-of the right vocal cord  Will start weekly carboplatin/taxol with XRT once he has a biopsy of the contralateral hypermetabolic left hilar lymph node-while this could represent?contralateral metastatic disease. ?It is quite possible this Patient has been approved and education was completed. He will be ready to start as soon as biopsy is complete  He is not a surgical candidate given his poor pulmonary function test and?hypoxemia requiring around the clock home oxygen.  RTC in?3 weeks or so with labs. Call and let us know?when Dr. Julien would like to start so we can get started.  If the biopsy has to be cancelled again, Dr. Julien does not want to attempt another biopsy and will start treatment.   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  Historical  No qualifying data  Procedure/Surgical History  Cataract ()  Circumcision ()  Cystectomy ()   Medications  amlodipine 10 mg oral tablet, 10 mg= 1 tab(s), Oral, Daily  Artificial Tears preserved ophthalmic solution, 1 drop(s), Eye-Both, QID, PRN  budesonide-formoterol 160 mcg-4.5 mcg/inh inhalation aerosol, 2 puff(s), INH, BID  chlorthalidone 25 mg oral tablet, 25 mg= 1 tab(s), Oral, Daily  clopidogrel 75 mg oral tablet, 75 mg= 1 tab(s), Oral, Daily  cyclobenzaprine 10 mg oral tablet, 10 mg= 1 tab(s), Oral, TID, PRN  diclofenac 1% topical gel, 1  apple, TOP, q8hr  donepezil 10 mg oral tablet, 10 mg= 1 tab(s), Oral, At Bedtime  gabapentin 600 mg oral tablet, 600 mg= 1 tab(s), Oral, At Bedtime  glipiZIDE 10 mg oral tablet, 10 mg= 1 tab(s), Oral, Daily  olodaterol 2.5 mcg/inh inhalation aerosol, 2 puff(s), INH, Daily  Pantoprazole 40 mg ORAL EC-Tablet, 40 mg= 1 tab(s), Oral, Daily  potassium chloride 10 mEq oral CAPSULE extended release, 20 mEq= 2 cap(s), Oral, Daily  Refresh Plus ophthalmic solution, 1 drop(s), Eye-Both, QID, PRN  sertraline 100 mg oral tablet, 100 mg= 1 tab(s), Oral, Daily  sildenafil 100 mg oral tablet, 100 mg= 1 tab(s), Oral, Daily  traZODone 100 mg oral tablet, 100 mg= 1 tab(s), Oral, At Bedtime,? ?Not Taking per Prescriber  Zofran 8 mg oral tablet, 8 mg= 1 tab(s), Oral, q8hr, PRN  Allergies  penicillins?(rash)  Social History  Alcohol  Current, 1-2 times per month, 01/24/2019  Substance Abuse  Current, Marijuana, 01/24/2019  Tobacco  Former smoker, quit more than 30 days ago, No, 03/12/2019  Former smoker, quit more than 30 days ago, N/A, 03/07/2019  Former smoker, quit more than 30 days ago, N/A, 02/07/2019  Former smoker, quit more than 30 days ago, No, 01/24/2019  Family History  Hypertension.: Mother.  Stroke: Mother.  Health Maintenance  Health Maintenance  ???Pending?(in the next year)  ??? ??Due?  ??? ? ? ?ADL Screening due??03/21/19??and every 1??year(s)  ??? ? ? ?Advance Directive due??03/21/19??and every 1??year(s)  ??? ? ? ?COPD Maintenance-Spirometry due??03/21/19??and every?  ??? ? ? ?Cognitive Screening due??03/21/19??and every 1??year(s)  ??? ? ? ?Diabetes Maintenance-Eye Exam due??03/21/19??and every?  ??? ? ? ?Diabetes Maintenance-Fasting Lipid Profile due??03/21/19??and every?  ??? ? ? ?Diabetes Maintenance-Foot Exam due??03/21/19??and every?  ??? ? ? ?Diabetes Maintenance-HgbA1c due??03/21/19??and every?  ??? ? ? ?Functional Assessment due??03/21/19??and every 1??year(s)  ??? ? ? ?Geriatric Depression Screening  due??03/21/19??and every 1??year(s)  ??? ? ? ?Pneumococcal Vaccine due??03/21/19??Variable frequency  ??? ? ? ?Pneumococcal Vaccine due??03/21/19??and every?  ??? ? ? ?Tetanus Vaccine due??03/21/19??and every 10??year(s)  ??? ? ? ?Zoster Vaccine due??03/21/19??and every 100??year(s)  ??? ??Due In Future?  ??? ? ? ?Hypertension Management-BMP not due until??02/07/20??and every 1??year(s)  ??? ? ? ?Hypertension Management-Blood Pressure not due until??03/11/20??and every 1??year(s)  ??? ? ? ?Fall Risk Assessment not due until??03/12/20??and every 1??year(s)  ??? ? ? ?Obesity Screening not due until??03/12/20??and every 1??year(s)  ???Satisfied?(in the past 1 year)  ??? ??Satisfied?  ??? ? ? ?Blood Pressure Screening on??03/12/19.??Satisfied by Domonique Dunlap LPN.  ??? ? ? ?Body Mass Index Check on??03/12/19.??Satisfied by Pipo DAILEY,BISI, , LDN, Rebecca T.  ??? ? ? ?Depression Screening on??03/12/19.??Satisfied by Domonique Dunlap LPN.  ??? ? ? ?Diabetes Screening on??02/07/19.??Satisfied by Bobby Gama  ??? ? ? ?Fall Risk Assessment on??03/12/19.??Satisfied by Domonique Dunlap LPN.  ??? ? ? ?Hypertension Management-Blood Pressure on??03/12/19.??Satisfied by Domonique Dunlap LPN.  ??? ? ? ?Obesity Screening on??03/12/19.??Satisfied by Pipo DAILEY,BISI, , LDN, Rebecca T.  ?  ?     [1]?Office Visit Note; Lisa WU, Aby Kennedy 03/07/2019 11:28 CST   patient discussed and reviewed with BRYCE Garcia Dr. is concerned about his radiation plan with his significant COPD and the risk of radiation and risk of radiation pneumonitis.? And would prefer he get chemotherapy alone at this point.? And use radiation as a consolidative approach, or if he gets symptomatic progression.  We will ask for PDL 1 to be sent on his tumor  Instead of carboplatin and Taxol weekly will change him to carboplatin and gemcitabine we will proceed with 2 cycles and restaging.? And if improving will continue with 4-6 cycles of combined  platinum doublet therapy and if progression consider immunotherapy carboplatin AUC of 5 q 21  Gemcitabine 800 mg meter squared day 1, 8 q. 21.

## 2022-05-02 NOTE — HISTORICAL [facility] CERNER
This is a historical note converted from Imra. Formatting and pictures may have been removed.  Please reference Irma for original formatting and attached multimedia. Chief Complaint  office visit-follow up  History of Present Illness  Referring physician: Sinai Perez MD  [facility]  ?   Radiation Oncologist: Jaime Julien MD  ?   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  ?????????????????????? 12/11/18  ????????????????????? Abnormal PET right vocal cord  ?? ? ? ? ? ? ? ? ? ?? Contralateral hypermetabolic left hilar node  ????????????????????? Abnormal rib uptake question of posttraumatic  ?   Current Treatment: Carboplatin AUC5 and Gemzar 800mg/m2 Day 1 and Day 8 of 21 day  ???????????????????????????????? cycle, to start 4/10/19  ?   Treatment History:  10/31/18: CT chest- right perihilar mass highly suggestive of underlying malignancy there were several mediastinal lymph nodes including AP?????  ???? window?node 1.7 x 0 point left perihilar node 1.3 x 1.4 cm and a 2.6 x 2.8 solid appearing mass in the right perihilar region.  ?   12/11/18: Bronchoscopy- RLL poorly differentiated SCC  PFTs-?FEV1 76%, FVC 84%  ???? Postbronchodilator: FEV1 86% FVC 88%; Total lung capacity 111; DLCO 44% Residual volume 189  ?   1/22/19: PET- focal asymmetric hypermetabolic active lesion 4.5 SUV on the right posterior vocal cord at the level of the cartilage.  Within the chest there is a large hypermetabolic solid mass involving the pulmonary right lower lobe with SUV of 14. ?With a contralateral hypermetabolic left hilar lymph node SUV of 5.4. ?There is no ipsilateral right hilar adenopathy. ?There is abnormal hypermetabolic activity involving the sequential anterior left sixth seventh and eighth rib with a nondisplaced fracture at the seventh rib presumed posttraumatic lipoma at the right lower lateral chest wall.  Peripheral atelectasis within the anterior  segment left lower lobe there is a 7 mm nodule within the superior segment left lower lobe impression highly concerning for bronchitic carcinoma involving the superior segment right lower lobe with a contralateral left hilar hypermetabolic lymph node. ?And activity involving multiple left lower anterior ribs.  ?   2/18/19: Bone scan shows old rib fractures, no evidence of metastatic disease.  ?  3/14/19: Patient was scheduled for biopsy of left hilar node, however, he did not stop his Plavix and this was rescheduled for 3/28/19.  ?  Interval History:  3/2019: Biopsy of left hilar node was inconclusive, will treat with chemotherapy and consider radiation in the future if symptomatic or on progression  ?  4/9/19: Patient presents to clinic to start chemotherapy,?which has been delayed due to his rescheduled appointments.  We discussed since hilar node was inconclusive, we will proceed with chemotherapy and avoid XRT?at this time.  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  Instead of carboplatin and Taxol weekly will change him to carboplatin and gemcitabine. He presents today with his spouse.  Review of Systems  A complete 12 point ROS was performed in full with pertinent positives as described in interval history. Remainder of ROS done in full and are negative.  Physical Exam  Vitals & Measurements  T:?36.8? ?C (Oral)? HR:?98(Peripheral)? BP:?138/76?  HT:?174?cm? WT:?77.7?kg? BMI:?25.66?  General:?well-developed well-nourished in no acute distress, O2 dependent  HEENT: PERRLA, EOMI, clear conjunctiva, eyelids normal, normocephalic, no mucositis, good dentition,  Neck: full range of motion, no thyromegaly or lymphadenopathy  Respiratory:?diminished lung sounds, occasional wheezing, on multiple inhaled medications  Cardiovascular:?regular rate and rhythm without murmurs, gallops or rubs  Gastrointestinal:?soft, non-tender, non-distended with normal bowel  sounds, without masses to palpation  Genitourinary: no CVA tenderness to palpation  Musculoskeletal:?full range of motion of all extremities/spine without limitation or discomfort  Integumentary: no rashes or skin lesions present  Cognition and speech: Oriented, speech clear and coherent  Psychiatric: Cooperative, appropriate mood and affect  Neurologic: cranial nerves intact, no signs of peripheral neurological deficit, motor/sensory function intact  The National Cancer [place] Toxicity Score  ECOG Performance Scale: 2 - Capable of all self-care but unable to carry out any work activities. Up and about greater than 50 percent of waking hours.?  Assessment/Plan  1. Stage IIIc squamous cell carcinoma of right bronchus PDL 1 expression is less than 1%.??ALK/ROS/RET negative  The lung targeted profile NGS is pending.?  ?   Imagin19: PET [id number] ? bone, carcinoma of the right lower lobe  ??????????????????? Abnormal PET right vocal cord  ?? ? ? ? ? ? ? ? ? ?Contralateral hypermetabolic left hilar node- awaiting biopsy  ????????????????????Abnormal rib uptake, question of posttraumatic- patient denies any trauma.  19: Bone scan to evaluate abnormal rib uptake on PET. Bone scan shows no evidence of osseous metastasis-  ?????????????Most likely old rib fractures.  Side effects of gemzar was discussed including, but not limited to lower extremity edema, fatigue, skin rash, alopecia, nausea and vomiting, diarrhea, stomatitis, and decreased blood counts which could increase risk of infection, increase risk of bleeding as well as?require PRBC?transfusion.  ?  Plan:  Proceed with cycle 1 on 4/10/19  RTC on 19 with Aby with a repeat CBC, BMP  -He is not a surgical candidate given his poor pulmonary function test and?hypoxemia requiring around the clock home oxygen.  -Dr. Julien is concerned about his radiation plan with his significant COPD and the risk of radiation and risk of radiation pneumonitis.?  And would prefer he get chemotherapy alone at this point.? And use radiation as a consolidative approach, or if he gets symptomatic progression.  I refilled his Norco today that he was receiving from Dr. Julien  -Instead of carboplatin and Taxol weekly will change him to carboplatin AUC of 5 and Gemcitabine 800 mg/m2 day 1, 8 q. 21  -Repeat imaging after 2 cycles; if improving will continue with 4-6 cycles of combined platinum doublet therapy and if progression consider immunotherapy  ?   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  Historical  No qualifying data  Procedure/Surgical History  Cataract (1989)  Circumcision (1960)  Cystectomy (1960)   Medications  amlodipine 10 mg oral tablet, 10 mg= 1 tab(s), Oral, Daily  Artificial Tears preserved ophthalmic solution, 1 drop(s), Eye-Both, QID, PRN  budesonide-formoterol 160 mcg-4.5 mcg/inh inhalation aerosol, 2 puff(s), INH, BID  chlorthalidone 25 mg oral tablet, 25 mg= 1 tab(s), Oral, Daily  clopidogrel 75 mg oral tablet, 75 mg= 1 tab(s), Oral, Daily  cyclobenzaprine 10 mg oral tablet, 10 mg= 1 tab(s), Oral, TID, PRN  diclofenac 1% topical gel, 1 apple, TOP, q8hr  donepezil 10 mg oral tablet, 10 mg= 1 tab(s), Oral, At Bedtime  gabapentin 600 mg oral tablet, 600 mg= 1 tab(s), Oral, At Bedtime  glipiZIDE 10 mg oral tablet, 10 mg= 1 tab(s), Oral, Daily  olodaterol 2.5 mcg/inh inhalation aerosol, 2 puff(s), INH, Daily  Pantoprazole 40 mg ORAL EC-Tablet, 40 mg= 1 tab(s), Oral, Daily  potassium chloride 10 mEq oral CAPSULE extended release, 20 mEq= 2 cap(s), Oral, Daily  Refresh Plus ophthalmic solution, 1 drop(s), Eye-Both, QID, PRN  sertraline 100 mg oral tablet, 100 mg= 1 tab(s), Oral, Daily  sildenafil 100 mg oral tablet, 100 mg= 1 tab(s), Oral, Daily  tamsulosin 0.4 mg oral capsule, 0.4 mg= 1 cap(s), Oral, Daily  traZODone 100 mg oral tablet, 100 mg= 1 tab(s), Oral, At Bedtime,? ?Not Taking per  Prescriber  Zofran 8 mg oral tablet, 8 mg= 1 tab(s), Oral, q8hr, PRN  Allergies  penicillins?(rash)  Social History  Alcohol  Current, 1-2 times per month, 01/24/2019  Substance Abuse  Current, Marijuana, 01/24/2019  Tobacco  Former smoker, quit more than 30 days ago, N/A, 04/09/2019  Former smoker, quit more than 30 days ago, N/A, 03/21/2019  Former smoker, quit more than 30 days ago, No, 03/12/2019  Former smoker, quit more than 30 days ago, N/A, 03/07/2019  Former smoker, quit more than 30 days ago, N/A, 02/07/2019  Former smoker, quit more than 30 days ago, No, 01/24/2019  Family History  Hypertension.: Mother.  Stroke: Mother.  Health Maintenance  Health Maintenance  ???Pending?(in the next year)  ??? ??Due?  ??? ? ? ?ADL Screening due??04/09/19??and every 1??year(s)  ??? ? ? ?Advance Directive due??04/09/19??and every 1??year(s)  ??? ? ? ?COPD Maintenance-Spirometry due??04/09/19??and every?  ??? ? ? ?Cognitive Screening due??04/09/19??and every 1??year(s)  ??? ? ? ?Diabetes Maintenance-Eye Exam due??04/09/19??and every?  ??? ? ? ?Diabetes Maintenance-Fasting Lipid Profile due??04/09/19??and every?  ??? ? ? ?Diabetes Maintenance-Foot Exam due??04/09/19??and every?  ??? ? ? ?Diabetes Maintenance-HgbA1c due??04/09/19??and every?  ??? ? ? ?Functional Assessment due??04/09/19??and every 1??year(s)  ??? ? ? ?Geriatric Depression Screening due??04/09/19??and every 1??year(s)  ??? ? ? ?Pneumococcal Vaccine due??04/09/19??Variable frequency  ??? ? ? ?Pneumococcal Vaccine due??04/09/19??and every?  ??? ? ? ?Tetanus Vaccine due??04/09/19??and every 10??year(s)  ??? ? ? ?Zoster Vaccine due??04/09/19??and every 100??year(s)  ??? ??Due In Future?  ??? ? ? ?Hypertension Management-BMP not due until??03/20/20??and every 1??year(s)  ??? ? ? ?Hypertension Management-Blood Pressure not due until??04/08/20??and every 1??year(s)  ???Satisfied?(in the past 1 year)  ??? ??Satisfied?  ??? ? ? ?Blood Pressure Screening  on??04/09/19.??Satisfied by Gemma Goddard LPN  ??? ? ? ?Body Mass Index Check on??04/09/19.??Satisfied by Gemma Goddard LPN  ??? ? ? ?Depression Screening on??04/09/19.??Satisfied by Gemma Goddard LPN  ??? ? ? ?Diabetes Screening on??03/21/19.??Satisfied by Mariam Lebron  ??? ? ? ?Fall Risk Assessment on??04/09/19.??Satisfied by Gemma Goddard LPN  ??? ? ? ?Hypertension Management-Blood Pressure on??04/09/19.??Satisfied by Gemma Goddard LPN  ??? ? ? ?Obesity Screening on??04/09/19.??Satisfied by Gemma Goddard LPN  ?  ?  Lab Results  Test Name Test Result Date/Time   WBC 6.2 x10(3)/mcL 04/09/2019 11:00 CDT   Hgb 14.1 gm/dL 04/09/2019 11:00 CDT   Hct 43.3 % 04/09/2019 11:00 CDT   Platelet 313 x10(3)/mcL 04/09/2019 11:00 CDT      [1]?Office Visit Note; Lisa WU, Aby Kennedy 03/21/2019 13:40 CDT

## 2022-05-02 NOTE — HISTORICAL [facility]
This is a historical note converted from Irma. Formatting and pictures may have been removed.  Please reference Irma for original formatting and attached multimedia. Chief Complaint  office visit-follow up  History of Present Illness  Current Treatment: Carboplatin AUC5 and Gemzar 800mg/m2 Day 1 and Day 8?every 21???????????? days??????????Started 4/10/19  ?  Interval History:  ?   Patient was seen by us on 4/30/119.? He did complain of some cramping?in his hands.He had cycle 2-day 1 of carboplatin and gemcitabine on 5/1/2019, it does not appear that he got cycle 2-day 8.? He is here today for follow-up.  Oncology Summary  Referring physician: Sinai Perez MD [facility]  ?   Radiation Oncologist: Jaime Julien MD  ?   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  ?????????????????????? 12/11/18  ?????????????????????? Abnormal PET right vocal cord  ?? ? ? ? ? ? ? ? ? ??? Contralateral hypermetabolic left hilar node  ?????????????????????? Abnormal rib uptake question of posttraumatic  ?  ?   Treatment History:  10/31/18: CT chest- right perihilar mass highly suggestive of underlying malignancy there were several mediastinal lymph nodes including AP?????  ???? window?node 1.7 x 0 point left perihilar node 1.3 x 1.4 cm and a 2.6 x 2.8 solid appearing mass in the right perihilar region.  ?   12/11/18: Bronchoscopy- RLL poorly differentiated SCC  PFTs-?FEV1 76%, FVC 84%  ???? Postbronchodilator: FEV1 86% FVC 88%; Total lung capacity 111; DLCO 44% Residual volume 189  ?   1/22/19: PET- focal asymmetric hypermetabolic active lesion 4.5 SUV on the right posterior vocal cord at the level of the cartilage.  Within the chest there is a large hypermetabolic solid mass involving the pulmonary right lower lobe with SUV of 14. ?With a contralateral hypermetabolic left hilar lymph node SUV of 5.4. ?There is no ipsilateral right hilar adenopathy. ?There is abnormal  hypermetabolic activity involving the sequential anterior left sixth seventh and eighth rib with a nondisplaced fracture at the seventh rib presumed posttraumatic lipoma at the right lower lateral chest wall.  Peripheral atelectasis within the anterior segment left lower lobe there is a 7 mm nodule within the superior segment left lower lobe impression highly concerning for bronchitic carcinoma involving the superior segment right lower lobe with a contralateral left hilar hypermetabolic lymph node. ?And activity involving multiple left lower anterior ribs.  ?   2/18/19: Bone scan shows old rib fractures, no evidence of metastatic disease.  ?  3/14/19: Patient was scheduled for biopsy of left hilar node, however, he did not stop his  ???????????? Plavix and this was rescheduled for 3/28/19.  ?  3/2019: Biopsy of left hilar node was inconclusive, will treat with chemotherapy and  ??????????? consider radiation in the future if symptomatic or on progression  Review of Systems  She denies any fever chills or night sweats.? Is a chronic productive cough no blood.? He has stable dyspnea on exertion. ?Stable orthopnea. ?No change in edema.? Nocturia. ?He has had loose stool. ?But no blood or black tarry stool.? He was asked to hold his Protonix. ?He is not having much heartburn or indigestion.? He was asked to make sure he is not taking anything over-the-counter?like senna or MiraLAX to cause diarrhea. ?He cannot recall any of the above. ?He still has chronic neuropathy.? And tingling.? He is almost out of his sugar medicine.??I asked him to contact his primary care so he can fill his medication.? I am not been checking the sugar  Physical Exam  Vitals & Measurements  T:?37.0? ?C (Oral)? HR:?97(Peripheral)? BP:?115/70?  HT:?174?cm? WT:?78.8?kg? BMI:?26.03?  VITAL SIGNS: ?Reviewed. ?[ ?]  GENERAL: [Well-developed well-nourished in no apparent cardiorespiratory distress]  Oxygen dependent  HEAD: Normocephalic,  atraumatic  EYES:? Pupils are equal.?? reactive to light.? Extraocular motions intact.? No scleral icterus.? No pallor.  EARS:? Hearing grossly intact.  MOUTH:? Oropharynx is clear without exudates or erythema.  NECK: Supple no adenopathy, no JVD.? Trachea midline  CHEST:?Scattered wheezing, coarse breath sounds bilaterally  CARDIAC:? Regular rate and rhythm.? S1 and S2, without murmurs, gallops, or rubs.  VASCULAR:? No Edema.? Peripheral pulses normal and equal in all extremities.  ABDOMEN:? Soft, without detectable tenderness.? No sign of distention.? No?? rebound or guarding, and no masses palpated.?? Bowel Sounds normal.  MUSCULOSKELETAL:?Spine nontender,?antalgic kyphotic type gait and posture.. Extremities without clubbing, cyanosis or edema.?  NEUROLOGIC EXAM:? Alert and oriented x 3.? Lower extremity strength 3+ out of 5. ?Upper extremity strength 4 out of 5..?? Speech normal.? Follows commands.? With a rolling walker  PSYCHIATRIC:? Mood normal.  SKIN:?Groin seborrheic keratosis.  Lymph nodes: no? supraclavicular, infraclavicular, axillary, or inguinal adenopathy  ECOG-2 [1]  Assessment/Plan  1.?Squamous cell carcinoma of bronchus in right lower lobe?C34.31  ?  2.?Pain?R52  ?  Stage IIIc squamous cell carcinoma of the right bronchus, PDL 1 expression less than 1%., ?ALK, ROS, RET all negative.  Proceed with cycle 3?of chemotherapy,?in a.m. and is day 8.  I explained to patient that he missed his day 8 chemotherapy. ?He is unaware of the above.? Not sure  Return to clinic in 3 weeks with CBC, CMP, magnesium phosphorus?and PET scan results.  ?   We called pharmacy,?we reviewed the  data.? Pharmacy states that he had a pains prescription filled on the 17th.? It is not crossed over to the  data at this point.? Patient reports that his medication is been stolen.? I told him to have the?police investigate. ?And bring me a copy of the police report.? I explained to him that he needs to be careful with his  narcotics. ?He needs to lock them up.? We will proceed?with cycle 3 of chemotherapy.? And see him back in 3 weeks time with PET results.  ?   If patient has?progressive disease,?I do not think this patient would tolerate second line therapy. ?In hospice and supportive care?would be in his best interest. ?Palliative radiation for pain may be helpful.  ?  ?  ?  ?   Problem List/Past Medical History  Ongoing  Pain  Review of care plan  Squamous cell carcinoma of bronchus in right lower lobe  Procedure/Surgical History  Cataract (1989)  Circumcision (1960)  Cystectomy (1960)   Medications  amlodipine 10 mg oral tablet, 10 mg= 1 tab(s), Oral, Daily  Artificial Tears preserved ophthalmic solution, 1 drop(s), Eye-Both, QID, PRN  budesonide-formoterol 160 mcg-4.5 mcg/inh inhalation aerosol, 2 puff(s), INH, BID  chlorthalidone 25 mg oral tablet, 25 mg= 1 tab(s), Oral, Daily  clopidogrel 75 mg oral tablet, 75 mg= 1 tab(s), Oral, Daily  diclofenac 1% topical gel, 1 apple, TOP, q8hr  donepezil 10 mg oral tablet, 10 mg= 1 tab(s), Oral, At Bedtime  gabapentin 600 mg oral tablet, 600 mg= 1 tab(s), Oral, At Bedtime  glipiZIDE 10 mg oral tablet, 10 mg= 1 tab(s), Oral, Daily  Heparin Flush 100 U/mL - 5 mL, 500 units= 5 mL, IV Push, Once-chemo  olodaterol 2.5 mcg/inh inhalation aerosol, 2 puff(s), INH, Daily  Pantoprazole 40 mg ORAL EC-Tablet, 40 mg= 1 tab(s), Oral, Daily  Percocet 10/325 oral tablet, 1 tab(s), Oral, q6hr, PRN  potassium chloride 10 mEq oral CAPSULE extended release, 20 mEq= 2 cap(s), Oral, Daily  Refresh Plus ophthalmic solution, 1 drop(s), Eye-Both, QID, PRN  sertraline 100 mg oral tablet, 100 mg= 1 tab(s), Oral, Daily  sildenafil 100 mg oral tablet, 100 mg= 1 tab(s), Oral, Daily  tamsulosin 0.4 mg oral capsule, 0.4 mg= 1 cap(s), Oral, Daily  Zofran 8 mg oral tablet, 8 mg= 1 tab(s), Oral, q8hr, 2 refills  Allergies  penicillins?(rash)  Social History  Alcohol  Current, 1-2 times per month, 01/24/2019  Substance  Abuse  Current, Marijuana, 01/24/2019  Tobacco  Former smoker, quit more than 30 days ago, N/A, 05/21/2019  Former smoker, quit more than 30 days ago, N/A, 05/01/2019  Former smoker, quit more than 30 days ago, N/A, 04/30/2019  Former smoker, quit more than 30 days ago, N/A, 04/18/2019  Former smoker, quit more than 30 days ago, N/A, 04/17/2019  Former smoker, quit more than 30 days ago, N/A, 04/10/2019  Former smoker, quit more than 30 days ago, N/A, 03/21/2019  Former smoker, quit more than 30 days ago, No, 03/12/2019  Former smoker, quit more than 30 days ago, N/A, 03/07/2019  Former smoker, quit more than 30 days ago, N/A, 02/07/2019  Former smoker, quit more than 30 days ago, No, 01/24/2019  Family History  Hypertension.: Mother.  Stroke: Mother.  Lab Results  Test Name Test Result Date/Time   WBC 3.5 x10(3)/mcL (Low) 05/21/2019 12:57 CDT   RBC 4.10 x10(6)/mcL (Low) 05/21/2019 12:57 CDT   Hgb 11.8 gm/dL (Low) 05/21/2019 12:57 CDT   Hct 36.1 % (Low) 05/21/2019 12:57 CDT   Platelet 285 x10(3)/mcL 05/21/2019 12:57 CDT   MCV 88.0 fL 05/21/2019 12:57 CDT   MCH 28.8 pg 05/21/2019 12:57 CDT   MCHC 32.7 gm/dL (Low) 05/21/2019 12:57 CDT   RDW 17.3 % (High) 05/21/2019 12:57 CDT   MPV 8.1 fL (Low) 05/21/2019 12:57 CDT   Abs Neut 1.36 x10(3)/mcL (Low) 05/21/2019 12:57 CDT   NEUT% 39.1 % 05/21/2019 12:57 CDT   NEUT# 1.4 x10(3)/mcL (Low) 05/21/2019 12:57 CDT   LYMPH% 44.1 % 05/21/2019 12:57 CDT   LYMPH# 1.5 x10(3)/mcL 05/21/2019 12:57 CDT   MONO% 15.9 % 05/21/2019 12:57 CDT   MONO# 0.6 x10(3)/mcL 05/21/2019 12:57 CDT   EOS% 0.6 % 05/21/2019 12:57 CDT   EOS# 0.0 x10(3)/mcL 05/21/2019 12:57 CDT   BASO% 0.3 % 05/21/2019 12:57 CDT   BASO# 0.0 x10(3)/mcL 05/21/2019 12:57 CDT      [1]?Office Visit Note; Andre GOOD, Sebastian GROVER 04/30/2019 13:46 CDT

## 2022-05-02 NOTE — HISTORICAL [facility] CERNER
This is a historical note converted from Irma. Formatting and pictures may have been removed.  Please reference Irma for original formatting and attached multimedia. Chief Complaint  office visit-follow up  History of Present Illness  Referring physician: Sinai Perez MD [facility]  ?   Radiation Oncologist: Jaime Julien MD  ?   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  ?????????????????????? 12/11/18  ?????????????????????? Abnormal PET right vocal cord  ?? ? ? ? ? ? ? ? ? ??? Contralateral hypermetabolic left hilar node  ?????????????????????? Abnormal rib uptake question of posttraumatic  ?   Current Treatment: Carboplatin AUC5 and Gemzar 800mg/m2 Day 1 and Day 8?every 21?  ??????????????????????????????? days  ????????????????????????????????Started 4/10/19  ?   Treatment History:  10/31/18: CT chest- right perihilar mass highly suggestive of underlying malignancy there were several mediastinal lymph nodes including AP?????  ???? window?node 1.7 x 0 point left perihilar node 1.3 x 1.4 cm and a 2.6 x 2.8 solid appearing mass in the right perihilar region.  ?   12/11/18: Bronchoscopy- RLL poorly differentiated SCC  PFTs-?FEV1 76%, FVC 84%  ???? Postbronchodilator: FEV1 86% FVC 88%; Total lung capacity 111; DLCO 44% Residual volume 189  ?   1/22/19: PET- focal asymmetric hypermetabolic active lesion 4.5 SUV on the right posterior vocal cord at the level of the cartilage.  Within the chest there is a large hypermetabolic solid mass involving the pulmonary right lower lobe with SUV of 14. ?With a contralateral hypermetabolic left hilar lymph node SUV of 5.4. ?There is no ipsilateral right hilar adenopathy. ?There is abnormal hypermetabolic activity involving the sequential anterior left sixth seventh and eighth rib with a nondisplaced fracture at the seventh rib presumed posttraumatic lipoma at the right lower lateral chest wall.  Peripheral  atelectasis within the anterior segment left lower lobe there is a 7 mm nodule within the superior segment left lower lobe impression highly concerning for bronchitic carcinoma involving the superior segment right lower lobe with a contralateral left hilar hypermetabolic lymph node. ?And activity involving multiple left lower anterior ribs.  ?   2/18/19: Bone scan shows old rib fractures, no evidence of metastatic disease.  ?  3/14/19: Patient was scheduled for biopsy of left hilar node, however, he did not stop his  ???????????? Plavix and this was rescheduled for 3/28/19.  ?  3/2019: Biopsy of left hilar node was inconclusive, will treat with chemotherapy and  ??????????? consider radiation in the future if symptomatic or on progression  ?  Interval History: [1]  Patient was last seen in clinic on 4/18/2019.  Patient denies any headache or visual change. ?He still feels?mild anorexia. ?No trouble swallowing.? Mild taste change. ?He has dyspnea on exertion. ?But stable. ?He has no PND orthopnea.? Mild edema in his legs.? No hemoptysis.? No change in bowel or bladder habits.? But does have urinary frequency with his medication. ?Nocturia.? His pain medicine seems to work. ?As he was changed from Lortab?to Percocet.  Review of Systems  Pertinent positives above in interim history, otherwise 12 point review of systems negative  Physical Exam  Vitals & Measurements  T:?36.8? ?C (Oral)? HR:?102(Peripheral)? BP:?99/66?  HT:?174?cm? WT:?78.4?kg? BMI:?25.9?  VITAL SIGNS: ?Reviewed. ?[ ?]  GENERAL: [Well-developed well-nourished in no apparent cardiorespiratory distress]  Oxygen dependent  HEAD: Normocephalic, atraumatic  EYES:? Pupils are equal.?? reactive to light.? Extraocular motions intact.? No scleral icterus.? No pallor.  EARS:? Hearing grossly intact.  MOUTH:? Oropharynx is clear without exudates or erythema.  NECK: Supple no adenopathy, no JVD.? Trachea midline  CHEST:?Scattered wheezing  CARDIAC:? Regular rate and  rhythm.? S1 and S2, without murmurs, gallops, or rubs.  VASCULAR:? No Edema.? Peripheral pulses normal and equal in all extremities.  ABDOMEN:? Soft, without detectable tenderness.? No sign of distention.? No?? rebound or guarding, and no masses palpated.?? Bowel Sounds normal.  MUSCULOSKELETAL:? Good range of motion of all major joints. Extremities without clubbing, cyanosis or edema.?  NEUROLOGIC EXAM:? Alert and oriented x 3.? No focal sensory or strength deficits.?? Speech normal.? Follows commands.  PSYCHIATRIC:? Mood normal.  SKIN:? No rash or lesions.  Lymph nodes: no? supraclavicular, infraclavicular, axillary, or inguinal adenopathy  ECOG-2  Assessment/Plan  1.?Squamous cell carcinoma of bronchus in right lower lobe?C34.31  Stage IIIc squamous cell carcinoma of the right bronchus, PDL 1 expression less than 1%,?  ALK/ROS/RET all negative  ?  Cycle 2 of carboplatin AUC of 5 and Gemzar?800mg/m2 ?on days 1 and 8?scheduled to start on 5/1/2019  ?  With mild hypotension and increasing urination, hold his fluid pill?,is okay to cut in half if his blood pressure goes back up.  We discussed with patient?treatment.? We discussed that if his cancer gets worse?CPR mechanical ventilation would be futile.? Continue with chemotherapy.   data reviewed.? Pain medication refilled  ?  RTC before cycle 3 wtih cbc/cmp  mg/phos  PET after cycle 3   Problem List/Past Medical History  Ongoing  Review of care plan  Squamous cell carcinoma of bronchus in right lower lobe  Procedure/Surgical History  Cataract (1989)  Circumcision (1960)  Cystectomy (1960)   Medications  amlodipine 10 mg oral tablet, 10 mg= 1 tab(s), Oral, Daily  Artificial Tears preserved ophthalmic solution, 1 drop(s), Eye-Both, QID, PRN  budesonide-formoterol 160 mcg-4.5 mcg/inh inhalation aerosol, 2 puff(s), INH, BID  chlorthalidone 25 mg oral tablet, 25 mg= 1 tab(s), Oral, Daily  clopidogrel 75 mg oral tablet, 75 mg= 1 tab(s), Oral, Daily  diclofenac 1%  topical gel, 1 apple, TOP, q8hr  donepezil 10 mg oral tablet, 10 mg= 1 tab(s), Oral, At Bedtime  gabapentin 600 mg oral tablet, 600 mg= 1 tab(s), Oral, At Bedtime  glipiZIDE 10 mg oral tablet, 10 mg= 1 tab(s), Oral, Daily  olodaterol 2.5 mcg/inh inhalation aerosol, 2 puff(s), INH, Daily  Pantoprazole 40 mg ORAL EC-Tablet, 40 mg= 1 tab(s), Oral, Daily  Percocet 10/325 oral tablet, 2 tab(s), Oral, q6hr, PRN  potassium chloride 10 mEq oral CAPSULE extended release, 20 mEq= 2 cap(s), Oral, Daily  Refresh Plus ophthalmic solution, 1 drop(s), Eye-Both, QID, PRN  sertraline 100 mg oral tablet, 100 mg= 1 tab(s), Oral, Daily  sildenafil 100 mg oral tablet, 100 mg= 1 tab(s), Oral, Daily  tamsulosin 0.4 mg oral capsule, 0.4 mg= 1 cap(s), Oral, Daily  Zofran 8 mg oral tablet, 8 mg= 1 tab(s), Oral, q8hr, PRN  Allergies  penicillins?(rash)  Social History  Alcohol  Current, 1-2 times per month, 01/24/2019  Substance Abuse  Current, Marijuana, 01/24/2019  Tobacco  Former smoker, quit more than 30 days ago, N/A, 04/30/2019  Former smoker, quit more than 30 days ago, N/A, 04/18/2019  Former smoker, quit more than 30 days ago, N/A, 04/17/2019  Former smoker, quit more than 30 days ago, N/A, 04/10/2019  Former smoker, quit more than 30 days ago, N/A, 04/09/2019  Former smoker, quit more than 30 days ago, N/A, 03/21/2019  Former smoker, quit more than 30 days ago, No, 03/12/2019  Former smoker, quit more than 30 days ago, N/A, 03/07/2019  Former smoker, quit more than 30 days ago, N/A, 02/07/2019  Former smoker, quit more than 30 days ago, No, 01/24/2019  Family History  Hypertension.: Mother.  Stroke: Mother.  Health Maintenance  Health Maintenance  ???Pending?(in the next year)  ??? ??Due?  ??? ? ? ?ADL Screening due??04/30/19??and every 1??year(s)  ??? ? ? ?Advance Directive due??04/30/19??and every 1??year(s)  ??? ? ? ?COPD Maintenance-Spirometry due??04/30/19??and every?  ??? ? ? ?Cognitive Screening due??04/30/19??and every  1??year(s)  ??? ? ? ?Diabetes Maintenance-Eye Exam due??04/30/19??and every?  ??? ? ? ?Diabetes Maintenance-Fasting Lipid Profile due??04/30/19??and every?  ??? ? ? ?Diabetes Maintenance-Foot Exam due??04/30/19??and every?  ??? ? ? ?Diabetes Maintenance-HgbA1c due??04/30/19??and every?  ??? ? ? ?Functional Assessment due??04/30/19??and every 1??year(s)  ??? ? ? ?Geriatric Depression Screening due??04/30/19??and every 1??year(s)  ??? ? ? ?Pneumococcal Vaccine due??04/30/19??Variable frequency  ??? ? ? ?Pneumococcal Vaccine due??04/30/19??and every?  ??? ? ? ?Tetanus Vaccine due??04/30/19??and every 10??year(s)  ??? ? ? ?Zoster Vaccine due??04/30/19??and every 100??year(s)  ??? ??Due In Future?  ??? ? ? ?Hypertension Management-BMP not due until??04/29/20??and every 1??year(s)  ??? ? ? ?Hypertension Management-Blood Pressure not due until??04/29/20??and every 1??year(s)  ???Satisfied?(in the past 1 year)  ??? ??Satisfied?  ??? ? ? ?Blood Pressure Screening on??04/30/19.??Satisfied by Gemma Goddard LPN  ??? ? ? ?Body Mass Index Check on??04/30/19.??Satisfied by Gemma Goddard LPN  ??? ? ? ?Depression Screening on??04/30/19.??Satisfied by Gemma Goddard LPN  ??? ? ? ?Diabetes Screening on??04/17/19.??Satisfied by Maryan Sommer.  ??? ? ? ?Fall Risk Assessment on??04/30/19.??Satisfied by Gemma Goddard LPN  ??? ? ? ?Hypertension Management-Blood Pressure on??04/30/19.??Satisfied by Gemma Goddard LPN  ??? ? ? ?Obesity Screening on??04/30/19.??Satisfied by Gemma Goddard LPN  ?  ?  Lab Results  Test Name Test Result Date/Time   POC Sodium 139 mmol/L 04/30/2019 13:11 CDT   POC Potassium 3.5 mmol/L 04/30/2019 13:11 CDT   POC Chloride 98 mmol/L 04/30/2019 13:11 CDT   POC Ion Calcium 1.20 mmol/L 04/30/2019 13:11 CDT   POC Glucose 158 mg/dL (High) 04/30/2019 13:11 CDT   POC BUN 8.0 mg/dL 04/30/2019 13:11 CDT   POC Creatinine 1.0 mg/dL 04/30/2019 13:11 CDT   WBC 4.7 x10(3)/mcL 04/30/2019 12:59 CDT   RBC  4.53 x10(6)/mcL (Low) 04/30/2019 12:59 CDT   Hgb 12.5 gm/dL (Low) 04/30/2019 12:59 CDT   Hct 38.4 % (Low) 04/30/2019 12:59 CDT   Platelet 295 x10(3)/mcL 04/30/2019 12:59 CDT   MCV 84.8 fL 04/30/2019 12:59 CDT   MCH 27.6 pg 04/30/2019 12:59 CDT   MCHC 32.6 gm/dL (Low) 04/30/2019 12:59 CDT   RDW 13.8 % 04/30/2019 12:59 CDT   MPV 8.0 fL (Low) 04/30/2019 12:59 CDT   Abs Neut 2.64 x10(3)/mcL 04/30/2019 12:59 CDT   NEUT% 56.0 % 04/30/2019 12:59 CDT   NEUT# 2.6 x10(3)/mcL 04/30/2019 12:59 CDT   LYMPH% 35.8 % 04/30/2019 12:59 CDT   LYMPH# 1.7 x10(3)/mcL 04/30/2019 12:59 CDT   MONO% 7.4 % 04/30/2019 12:59 CDT   MONO# 0.4 x10(3)/mcL 04/30/2019 12:59 CDT   EOS% 0.4 % 04/30/2019 12:59 CDT   EOS# 0.0 x10(3)/mcL 04/30/2019 12:59 CDT   BASO% 0.2 % 04/30/2019 12:59 CDT   BASO# 0.0 x10(3)/mcL 04/30/2019 12:59 CDT      [1]?Office Visit Note; Lisa NP, Aby Kennedy 04/18/2019 14:07 CDT

## 2022-05-02 NOTE — HISTORICAL [facility] CERNER
This is a historical note converted from Irma. Formatting and pictures may have been removed.  Please reference Irma for original formatting and attached multimedia. Chief Complaint  office visit-follow up  History of Present Illness  Referring physician: Sinai Perez MD [facility]  ?   Radiation Oncologist: Jaime Julien MD  ?   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  ?????????????????????? 12/11/18  ?????????????????????? Abnormal PET right vocal cord  ?? ? ? ? ? ? ? ? ? ??? Contralateral hypermetabolic left hilar node  ?????????????????????? Abnormal rib uptake question of posttraumatic  ?   Current Treatment: Carboplatin AUC5 and Gemzar 800mg/m2 Day 1 and Day 8?every 21?  ??????????????????????????????? days  ????????????????????????????????Started 4/10/19  ?   Treatment History:  10/31/18: CT chest- right perihilar mass highly suggestive of underlying malignancy there were several mediastinal lymph nodes including AP?????  ???? window?node 1.7 x 0 point left perihilar node 1.3 x 1.4 cm and a 2.6 x 2.8 solid appearing mass in the right perihilar region.  ?   12/11/18: Bronchoscopy- RLL poorly differentiated SCC  PFTs-?FEV1 76%, FVC 84%  ???? Postbronchodilator: FEV1 86% FVC 88%; Total lung capacity 111; DLCO 44% Residual volume 189  ?   1/22/19: PET- focal asymmetric hypermetabolic active lesion 4.5 SUV on the right posterior vocal cord at the level of the cartilage.  Within the chest there is a large hypermetabolic solid mass involving the pulmonary right lower lobe with SUV of 14. ?With a contralateral hypermetabolic left hilar lymph node SUV of 5.4. ?There is no ipsilateral right hilar adenopathy. ?There is abnormal hypermetabolic activity involving the sequential anterior left sixth seventh and eighth rib with a nondisplaced fracture at the seventh rib presumed posttraumatic lipoma at the right lower lateral chest wall.  Peripheral  atelectasis within the anterior segment left lower lobe there is a 7 mm nodule within the superior segment left lower lobe impression highly concerning for bronchitic carcinoma involving the superior segment right lower lobe with a contralateral left hilar hypermetabolic lymph node. ?And activity involving multiple left lower anterior ribs.  ?   2/18/19: Bone scan shows old rib fractures, no evidence of metastatic disease.  ?  3/14/19: Patient was scheduled for biopsy of left hilar node, however, he did not stop his  ???????????? Plavix and this was rescheduled for 3/28/19.  ?  3/2019: Biopsy of left hilar node was inconclusive, will treat with chemotherapy and  ??????????? consider radiation in the future if symptomatic or on progression  ?  Interval History:  4/18/19: Patient presents to clinic for toxicity check after starting chemotherapy. He completed cycle 1 on 4/17/19. He had a couple of days of nausea but no vomiting. His counts remained adequate. He tells me today that he has been defecating on himself for months, before starting chemotherapy. He has passive fecal incontinence, unaware of the need to have a BM. He does not have diarrhea or BRBPR. He has no abdominal pain, fevers. He has pleuritic type pain and says he was receiving Percocet from  that seemed to help him. He takes BID.  ?  Review of Systems  A complete 12 point ROS was performed in full with pertinent positives as described in interval history. Remainder of ROS done in full and are negative.  Physical Exam  Vitals & Measurements  T:?36.7? ?C (Oral)? HR:?98(Peripheral)? BP:?123/69?  HT:?174?cm? WT:?81.5?kg? BMI:?26.92?  td General:?well-developed well-nourished in no acute distress, O2 dependent  HEENT: PERRLA, EOMI, clear conjunctiva, eyelids normal, normocephalic, no mucositis, good dentition,  Neck: full range of motion, no thyromegaly or lymphadenopathy  Respiratory:?diminished lung sounds, occasional wheezing, on multiple  inhaled medications  Cardiovascular:?regular rate and rhythm without murmurs, gallops or rubs  Gastrointestinal:?soft, non-tender, non-distended with normal bowel sounds, without masses to palpation  Genitourinary: no CVA tenderness to palpation  Musculoskeletal:?full range of motion of all extremities/spine without limitation or discomfort  Integumentary: no rashes or skin lesions present  Cognition and speech: Oriented, speech clear and coherent  Psychiatric: Cooperative, appropriate mood and affect  Neurologic: cranial nerves intact, no signs of peripheral neurological deficit, motor/sensory function intact  The National Cancer [place] Toxicity Score  ECOG Performance Scale: 2 - Capable of all self-care but unable to carry out any work activities. Up and about greater than 50 percent of waking hours.  Assessment/Plan  1. Stage IIIc squamous cell carcinoma of right bronchus PDL 1 expression is less than 1%.??ALK/ROS/RET negative  The lung targeted profile NGS is pending.?  ?   Last Imagin19: PET [id number] ? bone, carcinoma of the right lower lobe  ??????????????????? Abnormal PET right vocal cord  ?? ? ? ? ? ? ? ? ? ?Contralateral hypermetabolic left hilar node biopsy  ????????????????????Abnormal rib uptake, question of posttraumatic- patient denies any trauma.  19: Bone scan to evaluate abnormal rib uptake on PET. Bone scan shows no evidence of osseous metastasis-  ?????????????Most likely old rib fractures.  ?   2. Passive fecal incontinence prior to starting chemotherapy.  He has appointment with his PCP next week to discuss  ?   Plan:  RTC on 19 before cycle 2 scheduled for 19 of carboplatin AUC of 5 and Gemcitabine 800 mg/m2 day 1, 8 q 3 weeks  ?  -He is not a surgical candidate given his poor pulmonary function test and?hypoxemia requiring around the clock home oxygen.  -Dr. Julien is concerned about his radiation plan with his significant COPD and the risk of radiation and risk of  radiation pneumonitis.? And would prefer he get chemotherapy alone at this point.? And use radiation as a consolidative approach, or if he gets symptomatic progression.  I refilled his?Percocet today that he was receiving from Dr. Julien. Previously was prescribed Norco  ?   -Repeat imaging after 2 cycles; if improving will continue with 4-6 cycles of combined platinum doublet therapy and if progression consider immunotherapy  ?   Problem List/Past Medical History  Ongoing  Review of care plan  Squamous cell carcinoma of bronchus in right lower lobe  Historical  No qualifying data  Procedure/Surgical History  Cataract (1989)  Circumcision (1960)  Cystectomy (1960)   Medications  amlodipine 10 mg oral tablet, 10 mg= 1 tab(s), Oral, Daily  Artificial Tears preserved ophthalmic solution, 1 drop(s), Eye-Both, QID, PRN  budesonide-formoterol 160 mcg-4.5 mcg/inh inhalation aerosol, 2 puff(s), INH, BID  chlorthalidone 25 mg oral tablet, 25 mg= 1 tab(s), Oral, Daily  clopidogrel 75 mg oral tablet, 75 mg= 1 tab(s), Oral, Daily  cyclobenzaprine 10 mg oral tablet, 10 mg= 1 tab(s), Oral, TID, PRN  diclofenac 1% topical gel, 1 apple, TOP, q8hr  donepezil 10 mg oral tablet, 10 mg= 1 tab(s), Oral, At Bedtime  gabapentin 600 mg oral tablet, 600 mg= 1 tab(s), Oral, At Bedtime  glipiZIDE 10 mg oral tablet, 10 mg= 1 tab(s), Oral, Daily  Norco 7.5 mg-325 mg oral tablet, 1 tab(s), Oral, q4hr, PRN  olodaterol 2.5 mcg/inh inhalation aerosol, 2 puff(s), INH, Daily  Pantoprazole 40 mg ORAL EC-Tablet, 40 mg= 1 tab(s), Oral, Daily  potassium chloride 10 mEq oral CAPSULE extended release, 20 mEq= 2 cap(s), Oral, Daily  Refresh Plus ophthalmic solution, 1 drop(s), Eye-Both, QID, PRN  sertraline 100 mg oral tablet, 100 mg= 1 tab(s), Oral, Daily  sildenafil 100 mg oral tablet, 100 mg= 1 tab(s), Oral, Daily  tamsulosin 0.4 mg oral capsule, 0.4 mg= 1 cap(s), Oral, Daily  traZODone 100 mg oral tablet, 100 mg= 1 tab(s), Oral, At Bedtime,? ?Not Taking  per Prescriber  Zofran 8 mg oral tablet, 8 mg= 1 tab(s), Oral, q8hr, PRN  Allergies  penicillins?(rash)  Social History  Alcohol  Current, 1-2 times per month, 01/24/2019  Substance Abuse  Current, Marijuana, 01/24/2019  Tobacco  Former smoker, quit more than 30 days ago, N/A, 04/18/2019  Former smoker, quit more than 30 days ago, N/A, 04/17/2019  Former smoker, quit more than 30 days ago, N/A, 04/10/2019  Former smoker, quit more than 30 days ago, N/A, 04/09/2019  Former smoker, quit more than 30 days ago, N/A, 03/21/2019  Former smoker, quit more than 30 days ago, No, 03/12/2019  Former smoker, quit more than 30 days ago, N/A, 03/07/2019  Former smoker, quit more than 30 days ago, N/A, 02/07/2019  Former smoker, quit more than 30 days ago, No, 01/24/2019  Family History  Hypertension.: Mother.  Stroke: Mother.  Health Maintenance  Health Maintenance  ???Pending?(in the next year)  ??? ??Due?  ??? ? ? ?ADL Screening due??04/18/19??and every 1??year(s)  ??? ? ? ?Advance Directive due??04/18/19??and every 1??year(s)  ??? ? ? ?COPD Maintenance-Spirometry due??04/18/19??and every?  ??? ? ? ?Cognitive Screening due??04/18/19??and every 1??year(s)  ??? ? ? ?Diabetes Maintenance-Eye Exam due??04/18/19??and every?  ??? ? ? ?Diabetes Maintenance-Fasting Lipid Profile due??04/18/19??and every?  ??? ? ? ?Diabetes Maintenance-Foot Exam due??04/18/19??and every?  ??? ? ? ?Diabetes Maintenance-HgbA1c due??04/18/19??and every?  ??? ? ? ?Functional Assessment due??04/18/19??and every 1??year(s)  ??? ? ? ?Geriatric Depression Screening due??04/18/19??and every 1??year(s)  ??? ? ? ?Pneumococcal Vaccine due??04/18/19??Variable frequency  ??? ? ? ?Pneumococcal Vaccine due??04/18/19??and every?  ??? ? ? ?Tetanus Vaccine due??04/18/19??and every 10??year(s)  ??? ? ? ?Zoster Vaccine due??04/18/19??and every 100??year(s)  ??? ??Due In Future?  ??? ? ? ?Hypertension Management-BMP not due until??04/17/20??and every 1??year(s)  ??? ? ?  ?Hypertension Management-Blood Pressure not due until??04/17/20??and every 1??year(s)  ???Satisfied?(in the past 1 year)  ??? ??Satisfied?  ??? ? ? ?Blood Pressure Screening on??04/18/19.??Satisfied by Gemma Goddard LPN  ??? ? ? ?Body Mass Index Check on??04/18/19.??Satisfied by Gemma Goddard LPN  ??? ? ? ?Depression Screening on??04/18/19.??Satisfied by Gemma Goddard LPN  ??? ? ? ?Diabetes Screening on??04/17/19.??Satisfied by Maryan Sommer  ??? ? ? ?Fall Risk Assessment on??04/18/19.??Satisfied by Gemma oGddard LPN  ??? ? ? ?Hypertension Management-BMP on??04/18/19.??Satisfied by Irma Solis  ??? ? ? ?Obesity Screening on??04/18/19.??Satisfied by Gemma Goddard LPN  ?  ?  Lab Results  Test Name Test Result Date/Time   POC Creatinine 0.9 mg/dL 04/18/2019 13:48 CDT   WBC 6.4 x10(3)/mcL 04/18/2019 13:33 CDT   Hgb 12.4 gm/dL (Low) 04/18/2019 13:33 CDT   Hct 37.5 % (Low) 04/18/2019 13:33 CDT   Platelet 200 x10(3)/mcL 04/18/2019 13:33 CDT